# Patient Record
Sex: FEMALE | Race: BLACK OR AFRICAN AMERICAN | Employment: FULL TIME | URBAN - METROPOLITAN AREA
[De-identification: names, ages, dates, MRNs, and addresses within clinical notes are randomized per-mention and may not be internally consistent; named-entity substitution may affect disease eponyms.]

---

## 2020-09-25 ENCOUNTER — HOSPITAL ENCOUNTER (EMERGENCY)
Facility: HOSPITAL | Age: 30
Discharge: HOME | End: 2020-09-25
Attending: EMERGENCY MEDICINE
Payer: COMMERCIAL

## 2020-09-25 VITALS
HEART RATE: 88 BPM | TEMPERATURE: 98 F | OXYGEN SATURATION: 97 % | HEIGHT: 67 IN | BODY MASS INDEX: 27.47 KG/M2 | WEIGHT: 175 LBS | DIASTOLIC BLOOD PRESSURE: 60 MMHG | RESPIRATION RATE: 18 BRPM | SYSTOLIC BLOOD PRESSURE: 116 MMHG

## 2020-09-25 DIAGNOSIS — M25.562 ACUTE PAIN OF BOTH KNEES: Primary | ICD-10-CM

## 2020-09-25 DIAGNOSIS — M25.561 ACUTE PAIN OF BOTH KNEES: Primary | ICD-10-CM

## 2020-09-25 PROCEDURE — 99282 EMERGENCY DEPT VISIT SF MDM: CPT

## 2020-09-25 RX ORDER — NAPROXEN 500 MG/1
500 TABLET ORAL 2 TIMES DAILY PRN
Qty: 20 TABLET | Refills: 0 | Status: SHIPPED | OUTPATIENT
Start: 2020-09-25 | End: 2021-04-07

## 2020-09-25 NOTE — ED INITIAL ASSESSMENT (HPI)
C/o bilateral knee pain and swelling, left greater than right, for past few weeks. Swelling new the past few days. Denies trauma.

## 2020-09-25 NOTE — ED PROVIDER NOTES
Patient Seen in: [facility] AND [facility]      History   Patient presents with:  Knee Pain    Stated Complaint: bilateral knee pain/sore    HPI    The patient is a 59-year-old female who presents with 2 to 3 weeks of pain to both knees.   In normal.      Breath sounds: Normal breath sounds. Musculoskeletal: Normal range of motion. Right knee: She exhibits normal range of motion, no swelling, no effusion, no erythema, no LCL laxity, no bony tenderness and no MCL laxity.  No tenderness fou

## 2020-10-29 ENCOUNTER — OFFICE VISIT (OUTPATIENT)
Dept: OBGYN CLINIC | Facility: CLINIC | Age: 30
End: 2020-10-29
Payer: COMMERCIAL

## 2020-10-29 ENCOUNTER — LAB ENCOUNTER (OUTPATIENT)
Dept: LAB | Facility: REFERENCE LAB | Age: 30
End: 2020-10-29
Attending: OBSTETRICS & GYNECOLOGY
Payer: COMMERCIAL

## 2020-10-29 VITALS
WEIGHT: 182 LBS | BODY MASS INDEX: 28.56 KG/M2 | SYSTOLIC BLOOD PRESSURE: 110 MMHG | DIASTOLIC BLOOD PRESSURE: 64 MMHG | HEIGHT: 67 IN

## 2020-10-29 DIAGNOSIS — Z32.01 PREGNANCY EXAMINATION OR TEST, POSITIVE RESULT: Primary | ICD-10-CM

## 2020-10-29 DIAGNOSIS — Z32.01 PREGNANCY EXAMINATION OR TEST, POSITIVE RESULT: ICD-10-CM

## 2020-10-29 DIAGNOSIS — Z11.3 SCREENING EXAMINATION FOR VENEREAL DISEASE: ICD-10-CM

## 2020-10-29 PROCEDURE — 87106 FUNGI IDENTIFICATION YEAST: CPT | Performed by: OBSTETRICS & GYNECOLOGY

## 2020-10-29 PROCEDURE — 84702 CHORIONIC GONADOTROPIN TEST: CPT

## 2020-10-29 PROCEDURE — 36415 COLL VENOUS BLD VENIPUNCTURE: CPT

## 2020-10-29 PROCEDURE — 84144 ASSAY OF PROGESTERONE: CPT

## 2020-10-29 PROCEDURE — 87808 TRICHOMONAS ASSAY W/OPTIC: CPT | Performed by: OBSTETRICS & GYNECOLOGY

## 2020-10-29 PROCEDURE — 3008F BODY MASS INDEX DOCD: CPT | Performed by: OBSTETRICS & GYNECOLOGY

## 2020-10-29 PROCEDURE — 81025 URINE PREGNANCY TEST: CPT | Performed by: OBSTETRICS & GYNECOLOGY

## 2020-10-29 PROCEDURE — 3074F SYST BP LT 130 MM HG: CPT | Performed by: OBSTETRICS & GYNECOLOGY

## 2020-10-29 PROCEDURE — 87591 N.GONORRHOEAE DNA AMP PROB: CPT | Performed by: OBSTETRICS & GYNECOLOGY

## 2020-10-29 PROCEDURE — 87491 CHLMYD TRACH DNA AMP PROBE: CPT | Performed by: OBSTETRICS & GYNECOLOGY

## 2020-10-29 PROCEDURE — 3078F DIAST BP <80 MM HG: CPT | Performed by: OBSTETRICS & GYNECOLOGY

## 2020-10-29 PROCEDURE — 87205 SMEAR GRAM STAIN: CPT | Performed by: OBSTETRICS & GYNECOLOGY

## 2020-10-29 PROCEDURE — 99203 OFFICE O/P NEW LOW 30 MIN: CPT | Performed by: OBSTETRICS & GYNECOLOGY

## 2020-10-29 RX ORDER — DEXAMETHASONE 4 MG/1
TABLET ORAL 2 TIMES DAILY
COMMUNITY
End: 2021-05-13

## 2020-10-29 RX ORDER — MONTELUKAST SODIUM 10 MG/1
10 TABLET ORAL NIGHTLY
COMMUNITY

## 2020-10-29 RX ORDER — ALBUTEROL SULFATE 90 UG/1
AEROSOL, METERED RESPIRATORY (INHALATION) EVERY 6 HOURS PRN
COMMUNITY

## 2020-10-29 RX ORDER — CALCIUM CARBONATE 300MG(750)
TABLET,CHEWABLE ORAL
COMMUNITY

## 2020-10-29 NOTE — PROGRESS NOTES
NEW GYN H&P     10/29/2020  11:06 AM    Patient presents with:  New Patient: pregnancy confirmation LMP 09/12/2020 EGA 7w2d  Nausea/vomiting: pt c/o morning sickness   .     HPI: Patient is a 27year old [id number] LMP 9/12/20 at EGA 7.2w here to establish car • History of use of contraceptive intrauterine device (IUD) 01/2017    Paragard IUD 01/2017-03/2020   • Human papilloma virus infection 2008   • Thyroid disease     thyroid nodule     Past Surgical History:   Procedure Laterality Date   • COLPOSCOPY, CE bilaterally nontender, no palpable masses, no nipple discharge, no skin changes, no axillary adenopathy  ABDOMEN: Soft, non distended; non tender, no masses  GYNE/:   External Genitalia: normal, no lesions, good perineal support  Urethra: meatus normal

## 2020-10-30 ENCOUNTER — PATIENT MESSAGE (OUTPATIENT)
Dept: OBGYN CLINIC | Facility: CLINIC | Age: 30
End: 2020-10-30

## 2020-10-30 NOTE — TELEPHONE ENCOUNTER
From: Sarina Oh  To: Aletha Hernandez MD  Sent: 10/30/2020 10:52 AM CDT  Subject: Test Results Question    Hi,   I have a question about HCG, BETA SUBUNIT (QUANT PREGNANCY TEST) resulted on 10/29/20, 9:43 PM.  What does these results mean?

## 2020-11-03 ENCOUNTER — LAB ENCOUNTER (OUTPATIENT)
Dept: LAB | Facility: HOSPITAL | Age: 30
End: 2020-11-03
Attending: OBSTETRICS & GYNECOLOGY
Payer: COMMERCIAL

## 2020-11-03 DIAGNOSIS — Z32.01 PREGNANCY EXAMINATION OR TEST, POSITIVE RESULT: ICD-10-CM

## 2020-11-03 PROCEDURE — 84702 CHORIONIC GONADOTROPIN TEST: CPT

## 2020-11-03 PROCEDURE — 36415 COLL VENOUS BLD VENIPUNCTURE: CPT

## 2020-11-03 PROCEDURE — 84144 ASSAY OF PROGESTERONE: CPT

## 2020-11-03 NOTE — PROGRESS NOTES
Released to [facility] and results viewed by pt. Outpatient Medication Detail     Disp Refills Start End   Progesterone Micronized (PROMETRIUM) 200 MG Oral Cap 60 capsule 0 10/30/2020 12/29/2020   Sig - Route: Take 1 capsule (200 mg total) by mouth daily.  -

## 2020-11-11 ENCOUNTER — HOSPITAL ENCOUNTER (OUTPATIENT)
Dept: ULTRASOUND IMAGING | Age: 30
Discharge: HOME | End: 2020-11-11
Attending: OBSTETRICS & GYNECOLOGY
Payer: COMMERCIAL

## 2020-11-11 DIAGNOSIS — Z32.01 PREGNANCY EXAMINATION OR TEST, POSITIVE RESULT: ICD-10-CM

## 2020-11-11 PROCEDURE — 76817 TRANSVAGINAL US OBSTETRIC: CPT | Performed by: OBSTETRICS & GYNECOLOGY

## 2020-11-11 PROCEDURE — 76801 OB US < 14 WKS SINGLE FETUS: CPT | Performed by: OBSTETRICS & GYNECOLOGY

## 2020-11-12 ENCOUNTER — TELEPHONE (OUTPATIENT)
Dept: OBGYN CLINIC | Facility: CLINIC | Age: 30
End: 2020-11-12

## 2020-11-12 NOTE — TELEPHONE ENCOUNTER
Called pt and has hx of migraine started 2 to 3 years ago and on medication. Pt can not find medication and taking Tylenol. Informed can also try caffeine (coffee) and contacting PCP. Pt verbalized understanding.

## 2020-11-12 NOTE — PROGRESS NOTES
Released to [employer] and results viewed by pt. Next Appt:    With  OBSTETRICS/GYNECOLOGY Ida De Leon MD)  11/20/2020 at 10:30 AM

## 2020-11-13 ENCOUNTER — TELEPHONE (OUTPATIENT)
Dept: OBGYN CLINIC | Facility: CLINIC | Age: 30
End: 2020-11-13

## 2020-11-13 NOTE — TELEPHONE ENCOUNTER
This RN placed call to patient to offer appt for RN education visit. Pt scheduled for 10/18 @1pm.  RN to mail packet of information to patient at [email or web address] All questions answered.

## 2020-11-18 ENCOUNTER — NURSE ONLY (OUTPATIENT)
Dept: OBGYN CLINIC | Facility: CLINIC | Age: 30
End: 2020-11-18
Payer: COMMERCIAL

## 2020-11-18 DIAGNOSIS — Z34.81 ENCOUNTER FOR SUPERVISION OF OTHER NORMAL PREGNANCY IN FIRST TRIMESTER: Primary | ICD-10-CM

## 2020-11-20 ENCOUNTER — TELEPHONE (OUTPATIENT)
Dept: OBGYN CLINIC | Facility: CLINIC | Age: 30
End: 2020-11-20

## 2020-11-23 ENCOUNTER — PATIENT MESSAGE (OUTPATIENT)
Dept: OBGYN CLINIC | Facility: CLINIC | Age: 30
End: 2020-11-23

## 2020-11-23 ENCOUNTER — LAB ENCOUNTER (OUTPATIENT)
Dept: LAB | Age: 30
End: 2020-11-23
Attending: OBSTETRICS & GYNECOLOGY
Payer: COMMERCIAL

## 2020-11-23 DIAGNOSIS — Z34.81 ENCOUNTER FOR SUPERVISION OF OTHER NORMAL PREGNANCY IN FIRST TRIMESTER: ICD-10-CM

## 2020-11-23 LAB
HBV SURFACE AG SER QL: NEGATIVE
HIV 1+2 AB+HIV1 P24 AG SERPL QL IA: NONREACTIVE
RUBV IGG SERPL IA-ACNC: NORMAL
TREPONEMA PALLIDUM IGG/IGM AB (SYPGM): NEGATIVE

## 2020-11-23 PROCEDURE — 86850 RBC ANTIBODY SCREEN: CPT

## 2020-11-23 PROCEDURE — 87389 HIV-1 AG W/HIV-1&-2 AB AG IA: CPT

## 2020-11-23 PROCEDURE — 86077 PHYS BLOOD BANK SERV XMATCH: CPT

## 2020-11-23 PROCEDURE — 36415 COLL VENOUS BLD VENIPUNCTURE: CPT

## 2020-11-23 PROCEDURE — 87340 HEPATITIS B SURFACE AG IA: CPT

## 2020-11-23 PROCEDURE — 86905 BLOOD TYPING RBC ANTIGENS: CPT

## 2020-11-23 PROCEDURE — 85025 COMPLETE CBC W/AUTO DIFF WBC: CPT

## 2020-11-23 PROCEDURE — 86880 COOMBS TEST DIRECT: CPT

## 2020-11-23 PROCEDURE — 86762 RUBELLA ANTIBODY: CPT

## 2020-11-23 PROCEDURE — 86900 BLOOD TYPING SEROLOGIC ABO: CPT

## 2020-11-23 PROCEDURE — 86870 RBC ANTIBODY IDENTIFICATION: CPT

## 2020-11-23 PROCEDURE — 86780 TREPONEMA PALLIDUM: CPT

## 2020-11-23 PROCEDURE — 86901 BLOOD TYPING SEROLOGIC RH(D): CPT

## 2020-11-23 PROCEDURE — 87086 URINE CULTURE/COLONY COUNT: CPT

## 2020-11-24 NOTE — TELEPHONE ENCOUNTER
From: Kimberly Schaumann  To: Nadine Santos MD  Sent: 11/23/2020 7:06 PM CST  Subject: Test Results Question    Hi Kiel Ward can you explain these results for me.  I have a question about ANTIBODY SCREEN resulted on 11/23/20, 6:51 PM.

## 2020-11-30 NOTE — PROGRESS NOTES
Released to [employer] and results viewed by pt. Next Appt:    With  OBSTETRICS/GYNECOLOGY Yudy Canchola MD)  12/02/2020 at 3:00 PM

## 2020-12-02 ENCOUNTER — TELEPHONE (OUTPATIENT)
Dept: OBGYN CLINIC | Facility: CLINIC | Age: 30
End: 2020-12-02

## 2020-12-02 ENCOUNTER — INITIAL PRENATAL (OUTPATIENT)
Dept: OBGYN CLINIC | Facility: CLINIC | Age: 30
End: 2020-12-02
Payer: COMMERCIAL

## 2020-12-02 VITALS
DIASTOLIC BLOOD PRESSURE: 80 MMHG | HEIGHT: 67 IN | BODY MASS INDEX: 29.15 KG/M2 | SYSTOLIC BLOOD PRESSURE: 120 MMHG | WEIGHT: 185.69 LBS

## 2020-12-02 DIAGNOSIS — R76.0 ELEVATED ANTIBODY LEVELS: ICD-10-CM

## 2020-12-02 DIAGNOSIS — Z87.59 HISTORY OF POSTPARTUM HEMORRHAGE: ICD-10-CM

## 2020-12-02 DIAGNOSIS — Z34.81 MULTIGRAVIDA IN FIRST TRIMESTER: Primary | ICD-10-CM

## 2020-12-02 DIAGNOSIS — O99.011 ANEMIA DURING PREGNANCY IN FIRST TRIMESTER: ICD-10-CM

## 2020-12-02 PROCEDURE — 3008F BODY MASS INDEX DOCD: CPT | Performed by: OBSTETRICS & GYNECOLOGY

## 2020-12-02 PROCEDURE — 3079F DIAST BP 80-89 MM HG: CPT | Performed by: OBSTETRICS & GYNECOLOGY

## 2020-12-02 PROCEDURE — 3074F SYST BP LT 130 MM HG: CPT | Performed by: OBSTETRICS & GYNECOLOGY

## 2020-12-02 RX ORDER — METHYLDOPA 500 MG
160 TABLET ORAL DAILY
Qty: 90 TABLET | Refills: 3 | Status: SHIPPED | OUTPATIENT
Start: 2020-12-02 | End: 2021-01-01

## 2020-12-02 RX ORDER — ASCORBIC ACID, CHOLECALCIFEROL, .ALPHA.-TOCOPHEROL, DL-, PYRIDOXINE HYDROCHLORIDE, FOLIC ACID, CYANOCOBALAMIN, CALCIUM CARBONATE, FERROUS FUMARATE, MAGNESIUM OXIDE AND DOCONEXENT 90; 220; 10; 26; 1; 13; 145; 28; 50; 300 MG/1; [IU]/1; [IU]/1; MG/1; MG/1; UG/1; MG/1; MG/1; MG/1; MG/1
1 CAPSULE, GELATIN COATED ORAL DAILY
Qty: 90 CAPSULE | Refills: 3 | Status: SHIPPED | OUTPATIENT
Start: 2020-12-02 | End: 2021-03-02

## 2020-12-02 NOTE — PROGRESS NOTES
Here for MD CASANOVA visit. Has completed RN education visit and had FFDNA drawn 11/23 - results still pending. History reviewed concerning chronic anemia of unsure etiology as well as 2 prior PPH with both NSVDs at [facility].  Discussed need for workup with

## 2020-12-02 NOTE — TELEPHONE ENCOUNTER
Precious from [facility] calling to edith with Dr. Valentina Baez.    Call/telephone encounter routed to LETY

## 2020-12-07 ENCOUNTER — TELEPHONE (OUTPATIENT)
Dept: OBGYN CLINIC | Facility: CLINIC | Age: 30
End: 2020-12-07

## 2020-12-07 NOTE — TELEPHONE ENCOUNTER
Patient requesting an call back in regard to to medication that were to be sent to pharmacy states wasn't there also requesting genetic testing results

## 2020-12-07 NOTE — TELEPHONE ENCOUNTER
Called [facility] for genetic test results and may be back by mid to end of week. Unable to leave message mailbox is full. I tired to call you and leave a message. Your mailbox is full. I called [facility] in regards to your genetic screen and it should be available mid to end of week. Also if you can call in regards to your medications, I am happy to assist you. Any concerns or additional questions, please call us at .

## 2020-12-14 ENCOUNTER — TELEPHONE (OUTPATIENT)
Dept: OBGYN CLINIC | Facility: CLINIC | Age: 30
End: 2020-12-14

## 2020-12-14 NOTE — TELEPHONE ENCOUNTER
RN returning pt call. Pt asking for results of genetic screening. RN informed pt that the usual turnaround time is 7-10 business days and we had not received them as of yet.  (pt was drawn 12/8/20) PT verbalized understanding, no other concerns at this time

## 2020-12-15 ENCOUNTER — TELEPHONE (OUTPATIENT)
Dept: OBGYN CLINIC | Facility: CLINIC | Age: 30
End: 2020-12-15

## 2020-12-16 NOTE — TELEPHONE ENCOUNTER
Fax received from [facility] that [facility] spoke with pt r/t negative cfDNA results. Fetal sex was not revealed to pt.
Per Dr. Duane Jonas ok to call pt with Negative [facility] cfDNA results. Spoke to pt, negative cfDNA results given and pt states gender is surprise. Pt voiced understanding and excited about results.
normal...

## 2021-01-06 ENCOUNTER — PATIENT MESSAGE (OUTPATIENT)
Dept: OBGYN CLINIC | Facility: CLINIC | Age: 31
End: 2021-01-06

## 2021-01-06 ENCOUNTER — ROUTINE PRENATAL (OUTPATIENT)
Dept: OBGYN CLINIC | Facility: CLINIC | Age: 31
End: 2021-01-06
Payer: COMMERCIAL

## 2021-01-06 VITALS
SYSTOLIC BLOOD PRESSURE: 120 MMHG | WEIGHT: 184 LBS | DIASTOLIC BLOOD PRESSURE: 80 MMHG | HEIGHT: 67 IN | BODY MASS INDEX: 28.88 KG/M2

## 2021-01-06 DIAGNOSIS — O99.011 ANEMIA DURING PREGNANCY IN FIRST TRIMESTER: Primary | ICD-10-CM

## 2021-01-06 DIAGNOSIS — Z36.9 UNSPECIFIED ANTENATAL SCREENING: ICD-10-CM

## 2021-01-06 DIAGNOSIS — Z34.81 MULTIGRAVIDA IN FIRST TRIMESTER: ICD-10-CM

## 2021-01-06 LAB — MULTISTIX LOT#: 1044 NUMERIC

## 2021-01-06 PROCEDURE — 3079F DIAST BP 80-89 MM HG: CPT | Performed by: OBSTETRICS & GYNECOLOGY

## 2021-01-06 PROCEDURE — 3008F BODY MASS INDEX DOCD: CPT | Performed by: OBSTETRICS & GYNECOLOGY

## 2021-01-06 PROCEDURE — 3074F SYST BP LT 130 MM HG: CPT | Performed by: OBSTETRICS & GYNECOLOGY

## 2021-01-06 PROCEDURE — 81002 URINALYSIS NONAUTO W/O SCOPE: CPT | Performed by: OBSTETRICS & GYNECOLOGY

## 2021-01-06 NOTE — PROGRESS NOTES
Here with no pregnancy complaints although reports episodes on and off of feeling heart race and shortness of breath over the past month. Not experiencing right now. Has not scheduled Hematology yet for chronic anemia Hgb of 9.3.  Encouraged patient to sche

## 2021-01-06 NOTE — TELEPHONE ENCOUNTER
Spoke to pt, explained attached link could not be opened. My health work email given to pt to sent over [facility] paperwork. explained to pt I will print out paper work and give to staff to complete paperwork. Pt voiced understanding.

## 2021-01-06 NOTE — TELEPHONE ENCOUNTER
From: Fan Moya  To: Gregory Brown MD  Sent: 1/6/2021 3:51 PM CST  Subject: Visit Follow-up Question    [email or web address]    Hi  here are my ADA forms.  They are for returning to work t

## 2021-01-07 ENCOUNTER — TELEPHONE (OUTPATIENT)
Dept: HEMATOLOGY/ONCOLOGY | Facility: HOSPITAL | Age: 31
End: 2021-01-07

## 2021-01-07 NOTE — TELEPHONE ENCOUNTER
Chantal Davila was referred to  by Payal Jose for  Anemia during pregnancy in first trimester  Z87.59 (ICD-10-CM) - V13.29 (ICD-9-CM) - History of postpartum hemorrhage. Is there an availability you would like me to offer this patient?
97.9

## 2021-01-07 NOTE — TELEPHONE ENCOUNTER
From: Mike Wesley  To: Higinio Canavan, MD  Sent: 1/6/2021 6:14 PM CST  Subject: Non-Urgent Medical Question    ADA forms.

## 2021-01-07 NOTE — TELEPHONE ENCOUNTER
From: Wily Cartwright  To: Shivani Walton MD  Sent: 1/6/2021 6:14 PM CST  Subject: Non-Urgent Medical Question    ADA forms.

## 2021-01-07 NOTE — TELEPHONE ENCOUNTER
Spoke with pt on 01/06/2021, aware I was able to print out all attached forms. Pt states she is a  and needs forms filled out before RTW 01/25/21. Per pt please fax forms back to number listed on FMLA forms.    Pt aware turn around time for

## 2021-01-07 NOTE — TELEPHONE ENCOUNTER
From: Jg Leon  To: Stu Castellanos MD  Sent: 1/6/2021 6:13 PM CST  Subject: Non-Urgent Medical Question    ADA

## 2021-01-11 ENCOUNTER — TELEPHONE (OUTPATIENT)
Dept: OBGYN CLINIC | Facility: CLINIC | Age: 31
End: 2021-01-11

## 2021-01-13 NOTE — TELEPHONE ENCOUNTER
Spoke to patient on 1/11/21 regarding her paperwork. 1/13/21, spoke to patient and informed her that there paperwork has been faxed. Patient verbalized understanding.

## 2021-01-19 ENCOUNTER — TELEPHONE (OUTPATIENT)
Dept: OBGYN CLINIC | Facility: CLINIC | Age: 31
End: 2021-01-19

## 2021-01-22 ENCOUNTER — APPOINTMENT (OUTPATIENT)
Dept: HEMATOLOGY/ONCOLOGY | Facility: HOSPITAL | Age: 31
End: 2021-01-22
Attending: INTERNAL MEDICINE
Payer: COMMERCIAL

## 2021-01-22 ENCOUNTER — TELEPHONE (OUTPATIENT)
Dept: HEMATOLOGY/ONCOLOGY | Facility: HOSPITAL | Age: 31
End: 2021-01-22

## 2021-01-22 NOTE — TELEPHONE ENCOUNTER
Makayla Pham came in for her consult today, but had to reschedule due to  issues. She was coming in for anemia during pregnancy, and rescheduled to the next available 2/19. Was there a sooner date you would like to see this patient?

## 2021-01-25 ENCOUNTER — HOSPITAL ENCOUNTER (OUTPATIENT)
Age: 31
Discharge: HOME | End: 2021-01-25
Attending: EMERGENCY MEDICINE
Payer: COMMERCIAL

## 2021-01-25 ENCOUNTER — TELEPHONE (OUTPATIENT)
Dept: OBGYN CLINIC | Facility: CLINIC | Age: 31
End: 2021-01-25

## 2021-01-25 VITALS
RESPIRATION RATE: 18 BRPM | HEART RATE: 99 BPM | DIASTOLIC BLOOD PRESSURE: 69 MMHG | SYSTOLIC BLOOD PRESSURE: 138 MMHG | OXYGEN SATURATION: 100 % | TEMPERATURE: 98 F

## 2021-01-25 DIAGNOSIS — J06.9 VIRAL URI WITH COUGH: Primary | ICD-10-CM

## 2021-01-25 DIAGNOSIS — Z87.09 HISTORY OF ASTHMA: ICD-10-CM

## 2021-01-25 DIAGNOSIS — Z3A.20 20 WEEKS GESTATION OF PREGNANCY: ICD-10-CM

## 2021-01-25 LAB — SARS-COV-2 RNA RESP QL NAA+PROBE: NOT DETECTED

## 2021-01-25 PROCEDURE — 99212 OFFICE O/P EST SF 10 MIN: CPT

## 2021-01-25 PROCEDURE — 99203 OFFICE O/P NEW LOW 30 MIN: CPT

## 2021-01-25 RX ORDER — PREDNISONE 20 MG/1
40 TABLET ORAL DAILY
Qty: 10 TABLET | Refills: 0 | Status: SHIPPED | OUTPATIENT
Start: 2021-01-25 | End: 2021-01-30

## 2021-01-25 NOTE — TELEPHONE ENCOUNTER
Pt would like ADA forms competed by Anthony Thrasher to be emailed to her at [email or web address]    Pt also c/o coughing and feeling sick, runny nose and chest pain. Pt is 19w2d. Pt planning on going to [facility] in [place].

## 2021-01-26 NOTE — ED INITIAL ASSESSMENT (HPI)
PATIENT ARRIVED AMBULATORY TO ROOM C/O A COUGH X2 WEEKS. PATIENT STATES \"IT FEELS LIKE MY ASTHMA. I GOT A NEW RX FOR AN INHALER THAT I HAVE NOT PICKED UP YET\" +NASAL CONGESTION. NO FEVERS. NO N/V/D. PATIENT STATES SHE IS NOT CONCERNED FOR COVID.  PATIENT

## 2021-01-26 NOTE — ED PROVIDER NOTES
Patient Seen in: [facility]      History   Patient presents with:  Asthma    Stated Complaint: asthma acting up, hard to breat, and chest hurts    HPI/Subjective:   HPI    The patient is a 20-year-old female G3 para 2 at approximately 20 week Device None (Room air)       Current:/69   Pulse 99   Temp 97.7 °F (36.5 °C)   Resp 18   LMP 09/12/2020 (Exact Date)   SpO2 100%         Physical Exam    Constitutional: Well-developed well-nourished in no acute distress  Head: Normocephalic, no swel pregnancy  History of asthma    Disposition:  Discharge  1/25/2021  7:13 pm    Follow-up:  Manav Benedict MD  [place] [address]  [phone]    In 2 days  Go to the ER immediately for any recurrent chest pain or

## 2021-01-29 DIAGNOSIS — Z36.9 UNSPECIFIED ANTENATAL SCREENING: Primary | ICD-10-CM

## 2021-02-02 ENCOUNTER — APPOINTMENT (OUTPATIENT)
Dept: HEMATOLOGY/ONCOLOGY | Facility: HOSPITAL | Age: 31
End: 2021-02-02
Attending: INTERNAL MEDICINE
Payer: COMMERCIAL

## 2021-02-02 ENCOUNTER — APPOINTMENT (OUTPATIENT)
Dept: GENERAL RADIOLOGY | Facility: HOSPITAL | Age: 31
End: 2021-02-02
Attending: EMERGENCY MEDICINE
Payer: COMMERCIAL

## 2021-02-02 ENCOUNTER — HOSPITAL ENCOUNTER (OUTPATIENT)
Facility: HOSPITAL | Age: 31
Setting detail: OBSERVATION
Discharge: HOME | End: 2021-02-02
Attending: OBSTETRICS & GYNECOLOGY | Admitting: OBSTETRICS & GYNECOLOGY
Payer: COMMERCIAL

## 2021-02-02 ENCOUNTER — APPOINTMENT (OUTPATIENT)
Dept: CT IMAGING | Facility: HOSPITAL | Age: 31
End: 2021-02-02
Attending: EMERGENCY MEDICINE
Payer: COMMERCIAL

## 2021-02-02 ENCOUNTER — HOSPITAL ENCOUNTER (EMERGENCY)
Facility: HOSPITAL | Age: 31
Discharge: HOME | End: 2021-02-02
Attending: EMERGENCY MEDICINE
Payer: COMMERCIAL

## 2021-02-02 VITALS
HEIGHT: 67 IN | DIASTOLIC BLOOD PRESSURE: 65 MMHG | RESPIRATION RATE: 20 BRPM | BODY MASS INDEX: 29.35 KG/M2 | HEART RATE: 101 BPM | SYSTOLIC BLOOD PRESSURE: 116 MMHG | OXYGEN SATURATION: 96 % | WEIGHT: 187 LBS

## 2021-02-02 VITALS — SYSTOLIC BLOOD PRESSURE: 115 MMHG | HEART RATE: 92 BPM | DIASTOLIC BLOOD PRESSURE: 66 MMHG

## 2021-02-02 DIAGNOSIS — S20.229A CONTUSION OF BACK, UNSPECIFIED LATERALITY, INITIAL ENCOUNTER: ICD-10-CM

## 2021-02-02 DIAGNOSIS — Z3A.21 21 WEEKS GESTATION OF PREGNANCY: ICD-10-CM

## 2021-02-02 DIAGNOSIS — S70.02XA CONTUSION OF LEFT HIP, INITIAL ENCOUNTER: ICD-10-CM

## 2021-02-02 DIAGNOSIS — S09.90XA INJURY OF HEAD, INITIAL ENCOUNTER: Primary | ICD-10-CM

## 2021-02-02 PROBLEM — Z34.90 PREGNANCY: Status: ACTIVE | Noted: 2021-02-02

## 2021-02-02 PROBLEM — Z34.90 PREGNANCY (HCC): Status: ACTIVE | Noted: 2021-02-02

## 2021-02-02 LAB
BILIRUB UR QL: NEGATIVE
COLOR UR: YELLOW
GLUCOSE UR-MCNC: NEGATIVE MG/DL
HGB UR QL STRIP.AUTO: NEGATIVE
KETONES UR-MCNC: 20 MG/DL
NITRITE UR QL STRIP.AUTO: NEGATIVE
PH UR: 6 [PH] (ref 5–8)
PROT UR-MCNC: 30 MG/DL
RBC #/AREA URNS AUTO: 3 /HPF
SP GR UR STRIP: 1.03 (ref 1–1.03)
UROBILINOGEN UR STRIP-ACNC: <2
WBC #/AREA URNS AUTO: 4 /HPF

## 2021-02-02 PROCEDURE — 70450 CT HEAD/BRAIN W/O DYE: CPT | Performed by: EMERGENCY MEDICINE

## 2021-02-02 PROCEDURE — 99284 EMERGENCY DEPT VISIT MOD MDM: CPT

## 2021-02-02 PROCEDURE — 81001 URINALYSIS AUTO W/SCOPE: CPT | Performed by: EMERGENCY MEDICINE

## 2021-02-02 PROCEDURE — 87086 URINE CULTURE/COLONY COUNT: CPT | Performed by: EMERGENCY MEDICINE

## 2021-02-02 PROCEDURE — 71045 X-RAY EXAM CHEST 1 VIEW: CPT | Performed by: EMERGENCY MEDICINE

## 2021-02-02 PROCEDURE — 99212 OFFICE O/P EST SF 10 MIN: CPT

## 2021-02-02 RX ORDER — NITROFURANTOIN 25; 75 MG/1; MG/1
100 CAPSULE ORAL 2 TIMES DAILY
Qty: 14 CAPSULE | Refills: 0 | Status: SHIPPED | OUTPATIENT
Start: 2021-02-02 | End: 2021-02-09

## 2021-02-03 ENCOUNTER — ULTRASOUND ENCOUNTER (OUTPATIENT)
Dept: OBGYN CLINIC | Facility: CLINIC | Age: 31
End: 2021-02-03
Payer: COMMERCIAL

## 2021-02-03 ENCOUNTER — ROUTINE PRENATAL (OUTPATIENT)
Dept: OBGYN CLINIC | Facility: CLINIC | Age: 31
End: 2021-02-03
Payer: COMMERCIAL

## 2021-02-03 VITALS
BODY MASS INDEX: 29.38 KG/M2 | WEIGHT: 187.19 LBS | DIASTOLIC BLOOD PRESSURE: 80 MMHG | SYSTOLIC BLOOD PRESSURE: 122 MMHG | HEIGHT: 67 IN

## 2021-02-03 DIAGNOSIS — O44.00 PLACENTA PREVIA WITHOUT HEMORRHAGE, ANTEPARTUM: ICD-10-CM

## 2021-02-03 DIAGNOSIS — Z34.82 MULTIGRAVIDA IN SECOND TRIMESTER: ICD-10-CM

## 2021-02-03 DIAGNOSIS — Z36.9 UNSPECIFIED ANTENATAL SCREENING: ICD-10-CM

## 2021-02-03 DIAGNOSIS — Z36.9 UNSPECIFIED ANTENATAL SCREENING: Primary | ICD-10-CM

## 2021-02-03 LAB — MULTISTIX LOT#: 5077 NUMERIC

## 2021-02-03 PROCEDURE — 3074F SYST BP LT 130 MM HG: CPT | Performed by: OBSTETRICS & GYNECOLOGY

## 2021-02-03 PROCEDURE — 76805 OB US >/= 14 WKS SNGL FETUS: CPT | Performed by: OBSTETRICS & GYNECOLOGY

## 2021-02-03 PROCEDURE — 76817 TRANSVAGINAL US OBSTETRIC: CPT | Performed by: OBSTETRICS & GYNECOLOGY

## 2021-02-03 PROCEDURE — 3079F DIAST BP 80-89 MM HG: CPT | Performed by: OBSTETRICS & GYNECOLOGY

## 2021-02-03 PROCEDURE — 81002 URINALYSIS NONAUTO W/O SCOPE: CPT | Performed by: OBSTETRICS & GYNECOLOGY

## 2021-02-03 PROCEDURE — 3008F BODY MASS INDEX DOCD: CPT | Performed by: OBSTETRICS & GYNECOLOGY

## 2021-02-03 RX ORDER — ACETAMINOPHEN 500 MG
TABLET ORAL
COMMUNITY
Start: 2020-01-20

## 2021-02-03 NOTE — ED INITIAL ASSESSMENT (HPI)
Patient presents to ER with complaints of back pain, neck pain, and headache. Denies C-spine tenderness. Patient had mechanical fall in which she slipped and fell on ice, landing no her back and hitting her head. Patient is 21 weeks pregnant.   Unsure

## 2021-02-03 NOTE — TRIAGE
[facility]      Triage Note    Dorene Jeffries Patient Status:  Outpatient    1990 MRN [id number]   Location [address]  Attending Kisha Escobedo MD   [facility] Day # 0 PCP Mady Nolan Para: fell at home outside and hit back of head and back, then stood and fell again down some stairs and fell on left side and bottom.  reports mild cramping now, denies leaking of fluid and bleeding, reports FM since fall        Mary Alford RN  2/2/2021 10:50 PM

## 2021-02-03 NOTE — PROGRESS NOTES
Seen in ER last night after falling on ice - no involvement of abdomen and normal head CT. Feeling FM. USN today at 20.4w shows normal anatomic survey and cervical length with persistent complete posterior previa.  Has had no bleeding and complying with pel

## 2021-02-03 NOTE — PROGRESS NOTES
Discharged to home per ambulatory in stable condition with written and verbal instructions. Patient verbalizes understanding of information given. She will follow up with MD tomorrow 2/3/21 in office.

## 2021-02-03 NOTE — ED PROVIDER NOTES
Patient Seen in: [facility] AND [facility] Emergency Department      History   Patient presents with:  Fall  Pain    Stated Complaint: Pain to entire backside of body s/p mechanical fall on ice +21 wks pregnant    HPI/Subjective:   HPI    Patient presents to the +21 wks pregnant  Other systems are as noted in HPI. Constitutional and vital signs reviewed. All other systems reviewed and negative except as noted above.     Physical Exam     ED Triage Vitals [02/02/21 1900]   /75   Pulse 106   Resp 20   Tem There is no midline tenderness to the neck or the back on palpation. There is some bilateral posterior rib pain left greater than right. There is no crepitus. Skin:     General: Skin is warm and dry. Findings: No rash.    Neurological:      Mental best information considering the mechanism of trauma and the  patient's pelvis could be shielded. Discussed this with the patient. Agrees to studies.                        Disposition and Plan     Clinical Impression:  Injury of head, initial encounter

## 2021-02-05 ENCOUNTER — TELEPHONE (OUTPATIENT)
Dept: OBGYN CLINIC | Facility: CLINIC | Age: 31
End: 2021-02-05

## 2021-02-05 NOTE — TELEPHONE ENCOUNTER
Pt called RN back. Pt informed that EB suggesting PCP. Pt stated she would just go to IC. Pt has no other concerns at this time.

## 2021-02-05 NOTE — TELEPHONE ENCOUNTER
Patient called to say she thinks she may have shingles. She has had it before and has it on face. Has normal symptoms.

## 2021-02-05 NOTE — TELEPHONE ENCOUNTER
RN contacted pt. Pt reports having shingles outbreak on nose last year. Pt states \"thingling\" sensation started yesterday with small bump on nose, reports waking up this morning with several bumps. Pt confirms being treated with Valtrex last year.  Pt is

## 2021-02-05 NOTE — TELEPHONE ENCOUNTER
RN spoke to EB. EB is okay with pt taking valtrex for shingles outbreak but is unfamiliar with treatment for shingles. EB advised pt follow up with PCP.      RN contacted pt to inform, mailbox full

## 2021-02-16 ENCOUNTER — TELEPHONE (OUTPATIENT)
Dept: OBGYN CLINIC | Facility: CLINIC | Age: 31
End: 2021-02-16

## 2021-02-16 RX ORDER — METOCLOPRAMIDE 5 MG/1
5 TABLET ORAL EVERY 6 HOURS PRN
Qty: 30 TABLET | Refills: 0 | Status: SHIPPED | OUTPATIENT
Start: 2021-02-16 | End: 2021-03-24

## 2021-02-16 NOTE — TELEPHONE ENCOUNTER
Pt has diarrhea, has been to the bathroom 4 x in the last 1.5 hour. Stomach hurts, feels like she has to throw up.

## 2021-02-16 NOTE — TELEPHONE ENCOUNTER
Called pt 22.3 week has diarrhea and nausea, ate cheerios and ice coffee in a mug, and water. Pt has gone to bathroom often (4x in 1.5 hours) and has stomach pain. Denies fever and vomiting. Advised to go to IC but declined at this time.   Per pt jane

## 2021-02-16 NOTE — TELEPHONE ENCOUNTER
Adrianne Osullivan MD  You 35 minutes ago (2:41 PM)     Please have her start taking an antiemetic. If she has Reglan or Zofran at home then use that if not then please prescribe Reglan 5mg q6h PRN dispense 30 with no refills.  If she doesn’t want a prescribed m

## 2021-02-19 ENCOUNTER — TELEPHONE (OUTPATIENT)
Dept: OBGYN CLINIC | Facility: CLINIC | Age: 31
End: 2021-02-19

## 2021-02-19 ENCOUNTER — APPOINTMENT (OUTPATIENT)
Dept: HEMATOLOGY/ONCOLOGY | Facility: HOSPITAL | Age: 31
End: 2021-02-19
Attending: INTERNAL MEDICINE
Payer: COMMERCIAL

## 2021-02-19 RX ORDER — FLUCONAZOLE 150 MG/1
TABLET ORAL
Qty: 2 TABLET | Refills: 0 | Status: SHIPPED | OUTPATIENT
Start: 2021-02-19 | End: 2021-03-24

## 2021-02-19 NOTE — TELEPHONE ENCOUNTER
Called pt and c/o yeast infection, itchiness with discharge thick. Diflucan ordered, instructions provided, and faxed to pharmacy on file. Pt does have f/u with EB on 03/03/21. Pt verbalized understanding and agrees with POC.

## 2021-02-19 NOTE — TELEPHONE ENCOUNTER
Pt. Roya Ribeiro she developed a yeast infection from the UTI medication.  She has discharge, and is  itchy

## 2021-02-26 ENCOUNTER — OFFICE VISIT (OUTPATIENT)
Dept: HEMATOLOGY/ONCOLOGY | Facility: HOSPITAL | Age: 31
End: 2021-02-26
Attending: INTERNAL MEDICINE
Payer: COMMERCIAL

## 2021-02-26 VITALS
RESPIRATION RATE: 16 BRPM | DIASTOLIC BLOOD PRESSURE: 64 MMHG | HEIGHT: 67 IN | TEMPERATURE: 97 F | WEIGHT: 191 LBS | SYSTOLIC BLOOD PRESSURE: 131 MMHG | BODY MASS INDEX: 29.98 KG/M2 | HEART RATE: 116 BPM | OXYGEN SATURATION: 100 %

## 2021-02-26 DIAGNOSIS — Z87.59 HISTORY OF POSTPARTUM HEMORRHAGE: ICD-10-CM

## 2021-02-26 DIAGNOSIS — O99.012 ANEMIA DURING PREGNANCY IN SECOND TRIMESTER: Primary | ICD-10-CM

## 2021-02-26 DIAGNOSIS — O99.012 ANEMIA DURING PREGNANCY IN SECOND TRIMESTER: ICD-10-CM

## 2021-02-26 LAB
ABO + RH BLD: NORMAL
ANTIBODY SCREEN: NEGATIVE
APTT PPP: 32 SECONDS (ref 23.2–35.3)
BASOPHILS # BLD AUTO: 0.02 X10(3) UL (ref 0–0.2)
BASOPHILS NFR BLD AUTO: 0.3 %
DEPRECATED HBV CORE AB SER IA-ACNC: 3.2 NG/ML
DEPRECATED RDW RBC AUTO: 39.8 FL (ref 35.1–46.3)
EOSINOPHIL # BLD AUTO: 0.13 X10(3) UL (ref 0–0.7)
EOSINOPHIL NFR BLD AUTO: 1.7 %
ERYTHROCYTE [DISTWIDTH] IN BLOOD BY AUTOMATED COUNT: 15.8 % (ref 11–15)
HCT VFR BLD AUTO: 27.6 %
HGB BLD-MCNC: 8.4 G/DL
IMM GRANULOCYTES # BLD AUTO: 0.05 X10(3) UL (ref 0–1)
IMM GRANULOCYTES NFR BLD: 0.6 %
INR BLD: 1.02 (ref 0.9–1.2)
IRON SATURATION: 5 %
IRON SERPL-MCNC: 23 UG/DL
LYMPHOCYTES # BLD AUTO: 1.7 X10(3) UL (ref 1–4)
LYMPHOCYTES NFR BLD AUTO: 22 %
MCH RBC QN AUTO: 21.6 PG (ref 26–34)
MCHC RBC AUTO-ENTMCNC: 30.4 G/DL (ref 31–37)
MCV RBC AUTO: 71.1 FL
MONOCYTES # BLD AUTO: 0.42 X10(3) UL (ref 0.1–1)
MONOCYTES NFR BLD AUTO: 5.4 %
NEUTROPHILS # BLD AUTO: 5.42 X10 (3) UL (ref 1.5–7.7)
NEUTROPHILS # BLD AUTO: 5.42 X10(3) UL (ref 1.5–7.7)
NEUTROPHILS NFR BLD AUTO: 70 %
PLATELET # BLD AUTO: 237 10(3)UL (ref 150–450)
PROTHROMBIN TIME: 13.2 SECONDS (ref 11.8–14.5)
RBC # BLD AUTO: 3.88 X10(6)UL
RH BLOOD TYPE: POSITIVE
TOTAL IRON BINDING CAPACITY: 487 UG/DL (ref 240–450)
TRANSFERRIN SERPL-MCNC: 327 MG/DL (ref 200–360)
WBC # BLD AUTO: 7.7 X10(3) UL (ref 4–11)

## 2021-02-26 PROCEDURE — 85025 COMPLETE CBC W/AUTO DIFF WBC: CPT

## 2021-02-26 PROCEDURE — 36415 COLL VENOUS BLD VENIPUNCTURE: CPT

## 2021-02-26 PROCEDURE — 86901 BLOOD TYPING SEROLOGIC RH(D): CPT

## 2021-02-26 PROCEDURE — 85610 PROTHROMBIN TIME: CPT

## 2021-02-26 PROCEDURE — 85730 THROMBOPLASTIN TIME PARTIAL: CPT

## 2021-02-26 PROCEDURE — 84466 ASSAY OF TRANSFERRIN: CPT

## 2021-02-26 PROCEDURE — 86850 RBC ANTIBODY SCREEN: CPT

## 2021-02-26 PROCEDURE — 99244 OFF/OP CNSLTJ NEW/EST MOD 40: CPT | Performed by: INTERNAL MEDICINE

## 2021-02-26 PROCEDURE — 86900 BLOOD TYPING SEROLOGIC ABO: CPT

## 2021-02-26 PROCEDURE — 82728 ASSAY OF FERRITIN: CPT

## 2021-02-26 PROCEDURE — 83540 ASSAY OF IRON: CPT

## 2021-02-26 NOTE — CONSULTS
TANNA Jones is a 27year old female who is here today at the request of Dr. Sharri Lofton for evaluation of Anemia during pregnancy in second trimester  (primary encounter diagnosis)  History of postpartum hemorrhage.       The anemia has been present Negative for abdominal pain and blood in stool. Genitourinary: Positive for menstrual problem (as above). Negative for hematuria. Recent UTI   Neurological: Positive for dizziness (occasional if moves too fast.) and headaches.    Hematological: Do DEVICE  01/2017    Paragard IUD    • REMOVE INTRAUTERINE DEVICE  03/2020     Social History    Tobacco Use      Smoking status: Never Smoker      Smokeless tobacco: Never Used    Alcohol use: Never      Frequency: Never    Drug use: Never      Family Histo for the  at the time of delivery. If this is still the case, would recommend that the  have a hematologist involved at the time of delivery for close monitoring of  hemolytic anemia.     No orders of the defined types were placed in t TREATCELL  Final result 11/23/2020             LITTLE E ANTIGEN  Final result 11/23/2020   positive          LITTLE C ANTIGEN  Final result 11/23/2020   positive          E ANTIGEN  Final result 11/23/2020             C ANTIGEN  Final result 11/23/2020

## 2021-03-01 PROBLEM — D50.9 IRON DEFICIENCY ANEMIA: Status: ACTIVE | Noted: 2021-03-01

## 2021-03-03 ENCOUNTER — TELEPHONE (OUTPATIENT)
Dept: HEMATOLOGY/ONCOLOGY | Facility: HOSPITAL | Age: 31
End: 2021-03-03

## 2021-03-03 ENCOUNTER — OFFICE VISIT (OUTPATIENT)
Dept: HEMATOLOGY/ONCOLOGY | Facility: HOSPITAL | Age: 31
End: 2021-03-03
Attending: INTERNAL MEDICINE
Payer: COMMERCIAL

## 2021-03-03 ENCOUNTER — ROUTINE PRENATAL (OUTPATIENT)
Dept: OBGYN CLINIC | Facility: CLINIC | Age: 31
End: 2021-03-03
Payer: COMMERCIAL

## 2021-03-03 VITALS
SYSTOLIC BLOOD PRESSURE: 113 MMHG | HEART RATE: 95 BPM | DIASTOLIC BLOOD PRESSURE: 69 MMHG | TEMPERATURE: 98 F | RESPIRATION RATE: 16 BRPM | OXYGEN SATURATION: 99 %

## 2021-03-03 VITALS
BODY MASS INDEX: 30.09 KG/M2 | HEIGHT: 67 IN | DIASTOLIC BLOOD PRESSURE: 70 MMHG | WEIGHT: 191.69 LBS | SYSTOLIC BLOOD PRESSURE: 106 MMHG

## 2021-03-03 DIAGNOSIS — O99.012 ANEMIA DURING PREGNANCY IN SECOND TRIMESTER: ICD-10-CM

## 2021-03-03 DIAGNOSIS — Z36.9 UNSPECIFIED ANTENATAL SCREENING: Primary | ICD-10-CM

## 2021-03-03 DIAGNOSIS — Z34.82 MULTIGRAVIDA IN SECOND TRIMESTER: ICD-10-CM

## 2021-03-03 DIAGNOSIS — D50.9 IRON DEFICIENCY ANEMIA, UNSPECIFIED IRON DEFICIENCY ANEMIA TYPE: Primary | ICD-10-CM

## 2021-03-03 DIAGNOSIS — O44.00 PLACENTA PREVIA WITHOUT HEMORRHAGE, ANTEPARTUM: ICD-10-CM

## 2021-03-03 LAB — MULTISTIX LOT#: 5077 NUMERIC

## 2021-03-03 PROCEDURE — 3078F DIAST BP <80 MM HG: CPT | Performed by: OBSTETRICS & GYNECOLOGY

## 2021-03-03 PROCEDURE — 3074F SYST BP LT 130 MM HG: CPT | Performed by: OBSTETRICS & GYNECOLOGY

## 2021-03-03 PROCEDURE — 3008F BODY MASS INDEX DOCD: CPT | Performed by: OBSTETRICS & GYNECOLOGY

## 2021-03-03 PROCEDURE — 81002 URINALYSIS NONAUTO W/O SCOPE: CPT | Performed by: OBSTETRICS & GYNECOLOGY

## 2021-03-03 PROCEDURE — 96374 THER/PROPH/DIAG INJ IV PUSH: CPT

## 2021-03-03 RX ORDER — VALACYCLOVIR HYDROCHLORIDE 1 G/1
TABLET, FILM COATED ORAL 3 TIMES DAILY
COMMUNITY
Start: 2021-02-05 | End: 2021-04-07

## 2021-03-03 NOTE — PROGRESS NOTES
Active FM. Doing well - undergoing iron infusion for anemia with CBC to be done today. Placenta previa stable with no vaginal bleeding. Last USN was complete and posterior - repeat USN scheduled for 28w when here for GTT + tdap + CBC.

## 2021-03-03 NOTE — TELEPHONE ENCOUNTER
Returned call to [place], she had Venofer today and when she got home she found swelling to and around IV site size of a dinner roll, no bleeding or drainage, some discomfort/pressure, no rash or other side effects at this time,  Instructed to apply cold fo

## 2021-03-03 NOTE — PROGRESS NOTES
Yodit to infusion for Venofer 1/5 for  Anemia during pregnancy in second trimester    She arrives ambulatory and reports feeling well at this time. Discuss medication profile, possible SE   Venofer administered over 5 minutes, tolerated well.   Observed f

## 2021-03-05 ENCOUNTER — OFFICE VISIT (OUTPATIENT)
Dept: HEMATOLOGY/ONCOLOGY | Facility: HOSPITAL | Age: 31
End: 2021-03-05
Attending: INTERNAL MEDICINE
Payer: COMMERCIAL

## 2021-03-05 VITALS
TEMPERATURE: 98 F | DIASTOLIC BLOOD PRESSURE: 65 MMHG | SYSTOLIC BLOOD PRESSURE: 134 MMHG | OXYGEN SATURATION: 98 % | HEART RATE: 100 BPM | RESPIRATION RATE: 16 BRPM

## 2021-03-05 DIAGNOSIS — O99.012 ANEMIA DURING PREGNANCY IN SECOND TRIMESTER: ICD-10-CM

## 2021-03-05 DIAGNOSIS — D50.9 IRON DEFICIENCY ANEMIA, UNSPECIFIED IRON DEFICIENCY ANEMIA TYPE: Primary | ICD-10-CM

## 2021-03-05 PROCEDURE — 96374 THER/PROPH/DIAG INJ IV PUSH: CPT

## 2021-03-05 NOTE — PROGRESS NOTES
Yodit to infusion today for Venofer 2 of 5. She arrives alert and independent. She is in 2nd trimester of pregnancy, getting iron infusions prior to  d/t having hemorrhaged with deliveries of first 2 children.  She reports a headache and feeling t

## 2021-03-09 ENCOUNTER — OFFICE VISIT (OUTPATIENT)
Dept: HEMATOLOGY/ONCOLOGY | Facility: HOSPITAL | Age: 31
End: 2021-03-09
Attending: INTERNAL MEDICINE
Payer: COMMERCIAL

## 2021-03-09 VITALS
TEMPERATURE: 98 F | SYSTOLIC BLOOD PRESSURE: 117 MMHG | DIASTOLIC BLOOD PRESSURE: 72 MMHG | RESPIRATION RATE: 16 BRPM | HEART RATE: 99 BPM | OXYGEN SATURATION: 100 %

## 2021-03-09 DIAGNOSIS — D50.9 IRON DEFICIENCY ANEMIA, UNSPECIFIED IRON DEFICIENCY ANEMIA TYPE: Primary | ICD-10-CM

## 2021-03-09 DIAGNOSIS — O99.012 ANEMIA DURING PREGNANCY IN SECOND TRIMESTER: ICD-10-CM

## 2021-03-09 PROCEDURE — 96374 THER/PROPH/DIAG INJ IV PUSH: CPT

## 2021-03-09 NOTE — PROGRESS NOTES
Yodit to infusion today for Venofer 3 of 5. She arrives alert and independent. She is in 2nd trimester of pregnancy (due in ), getting iron infusions prior to  d/t having hemorrhaged with deliveries of first 2 children.    Reports gissell t

## 2021-03-11 ENCOUNTER — OFFICE VISIT (OUTPATIENT)
Dept: HEMATOLOGY/ONCOLOGY | Facility: HOSPITAL | Age: 31
End: 2021-03-11
Attending: INTERNAL MEDICINE
Payer: COMMERCIAL

## 2021-03-11 VITALS
HEART RATE: 103 BPM | TEMPERATURE: 98 F | RESPIRATION RATE: 16 BRPM | DIASTOLIC BLOOD PRESSURE: 74 MMHG | OXYGEN SATURATION: 100 % | SYSTOLIC BLOOD PRESSURE: 116 MMHG

## 2021-03-11 DIAGNOSIS — O99.012 ANEMIA DURING PREGNANCY IN SECOND TRIMESTER: ICD-10-CM

## 2021-03-11 DIAGNOSIS — D50.9 IRON DEFICIENCY ANEMIA, UNSPECIFIED IRON DEFICIENCY ANEMIA TYPE: Primary | ICD-10-CM

## 2021-03-11 PROCEDURE — 96374 THER/PROPH/DIAG INJ IV PUSH: CPT

## 2021-03-11 NOTE — PROGRESS NOTES
Yodit to infusion today for Venofer 4 of 5. She arrives alert and independent. She is in 2nd trimester of pregnancy, getting iron infusions prior to  d/t having hemorrhaged with deliveries of first 2 children. Offers no new complaints today.

## 2021-03-15 ENCOUNTER — OFFICE VISIT (OUTPATIENT)
Dept: HEMATOLOGY/ONCOLOGY | Facility: HOSPITAL | Age: 31
End: 2021-03-15
Attending: INTERNAL MEDICINE
Payer: COMMERCIAL

## 2021-03-15 VITALS
SYSTOLIC BLOOD PRESSURE: 125 MMHG | HEART RATE: 102 BPM | DIASTOLIC BLOOD PRESSURE: 65 MMHG | RESPIRATION RATE: 16 BRPM | OXYGEN SATURATION: 100 % | TEMPERATURE: 98 F

## 2021-03-15 DIAGNOSIS — O99.012 ANEMIA DURING PREGNANCY IN SECOND TRIMESTER: ICD-10-CM

## 2021-03-15 DIAGNOSIS — D50.9 IRON DEFICIENCY ANEMIA, UNSPECIFIED IRON DEFICIENCY ANEMIA TYPE: Primary | ICD-10-CM

## 2021-03-15 PROCEDURE — 96374 THER/PROPH/DIAG INJ IV PUSH: CPT

## 2021-03-15 NOTE — PROGRESS NOTES
Yodit to infusion today for Venofer 5 of 5. She arrives alert and independent. She is in 2nd trimester of pregnancy, getting iron infusions prior to  d/t having hemorrhaged with deliveries of first 2 children. Offers no new complaints today.

## 2021-03-24 ENCOUNTER — PATIENT MESSAGE (OUTPATIENT)
Dept: OBGYN CLINIC | Facility: CLINIC | Age: 31
End: 2021-03-24

## 2021-03-24 ENCOUNTER — ROUTINE PRENATAL (OUTPATIENT)
Dept: OBGYN CLINIC | Facility: CLINIC | Age: 31
End: 2021-03-24
Payer: COMMERCIAL

## 2021-03-24 ENCOUNTER — ULTRASOUND ENCOUNTER (OUTPATIENT)
Dept: OBGYN CLINIC | Facility: CLINIC | Age: 31
End: 2021-03-24
Payer: COMMERCIAL

## 2021-03-24 ENCOUNTER — LAB ENCOUNTER (OUTPATIENT)
Dept: LAB | Age: 31
End: 2021-03-24
Attending: OBSTETRICS & GYNECOLOGY
Payer: COMMERCIAL

## 2021-03-24 VITALS
WEIGHT: 196.81 LBS | SYSTOLIC BLOOD PRESSURE: 122 MMHG | HEIGHT: 67 IN | DIASTOLIC BLOOD PRESSURE: 80 MMHG | BODY MASS INDEX: 30.89 KG/M2

## 2021-03-24 DIAGNOSIS — Z36.9 UNSPECIFIED ANTENATAL SCREENING: Primary | ICD-10-CM

## 2021-03-24 DIAGNOSIS — O44.00 PLACENTA PREVIA WITHOUT HEMORRHAGE, ANTEPARTUM: ICD-10-CM

## 2021-03-24 DIAGNOSIS — Z36.9 UNSPECIFIED ANTENATAL SCREENING: ICD-10-CM

## 2021-03-24 DIAGNOSIS — O99.013 ANEMIA DURING PREGNANCY IN THIRD TRIMESTER: ICD-10-CM

## 2021-03-24 DIAGNOSIS — Z34.83 MULTIGRAVIDA IN THIRD TRIMESTER: ICD-10-CM

## 2021-03-24 LAB
DEPRECATED RDW RBC AUTO: 54 FL (ref 35.1–46.3)
ERYTHROCYTE [DISTWIDTH] IN BLOOD BY AUTOMATED COUNT: 20.6 % (ref 11–15)
GLUCOSE 1H P GLC SERPL-MCNC: 108 MG/DL
HCT VFR BLD AUTO: 30.9 %
HGB BLD-MCNC: 9.1 G/DL
MCH RBC QN AUTO: 22 PG (ref 26–34)
MCHC RBC AUTO-ENTMCNC: 29.4 G/DL (ref 31–37)
MCV RBC AUTO: 74.6 FL
MULTISTIX LOT#: 5077 NUMERIC
PLATELET # BLD AUTO: 252 10(3)UL (ref 150–450)
RBC # BLD AUTO: 4.14 X10(6)UL
WBC # BLD AUTO: 6.7 X10(3) UL (ref 4–11)

## 2021-03-24 PROCEDURE — 3074F SYST BP LT 130 MM HG: CPT | Performed by: OBSTETRICS & GYNECOLOGY

## 2021-03-24 PROCEDURE — 85027 COMPLETE CBC AUTOMATED: CPT

## 2021-03-24 PROCEDURE — 3079F DIAST BP 80-89 MM HG: CPT | Performed by: OBSTETRICS & GYNECOLOGY

## 2021-03-24 PROCEDURE — 90715 TDAP VACCINE 7 YRS/> IM: CPT | Performed by: OBSTETRICS & GYNECOLOGY

## 2021-03-24 PROCEDURE — 36415 COLL VENOUS BLD VENIPUNCTURE: CPT

## 2021-03-24 PROCEDURE — 90471 IMMUNIZATION ADMIN: CPT | Performed by: OBSTETRICS & GYNECOLOGY

## 2021-03-24 PROCEDURE — 3008F BODY MASS INDEX DOCD: CPT | Performed by: OBSTETRICS & GYNECOLOGY

## 2021-03-24 PROCEDURE — 82950 GLUCOSE TEST: CPT

## 2021-03-24 PROCEDURE — 76816 OB US FOLLOW-UP PER FETUS: CPT | Performed by: OBSTETRICS & GYNECOLOGY

## 2021-03-24 PROCEDURE — 81002 URINALYSIS NONAUTO W/O SCOPE: CPT | Performed by: OBSTETRICS & GYNECOLOGY

## 2021-03-24 NOTE — PROGRESS NOTES
Tdap Vaccine administered in Left arm IM. Patient is 27w4d pregnant. Patient tolerated injection well no reaction at this time. 2 patient identifiers verified with pt. Ordering Dr. Ce Ramos.

## 2021-03-24 NOTE — PROGRESS NOTES
Here for RHONDA - active FM and no bleeding. Completed weekly IV Venofer x 5 infusions from 3/5 to 3/15/21 with planned follow up visit in April with Dr. Chapin Gonzalez.  USN for previa evaluation completed today = 27.4w 1319g(2#15oz) 73% with posterior complete previa n

## 2021-03-25 NOTE — TELEPHONE ENCOUNTER
Result Notes and Patient Communication  Comment seen by patient Zahra Hernandez on 3/24/2021  7:03 PM CDT  Back to Top    Your lab results show normal GTT and improved anemia        Laure Moya MD   Written by Deneen Baltazar MD on 3/24/2021  7:02

## 2021-04-07 ENCOUNTER — ROUTINE PRENATAL (OUTPATIENT)
Dept: OBGYN CLINIC | Facility: CLINIC | Age: 31
End: 2021-04-07
Payer: COMMERCIAL

## 2021-04-07 VITALS
HEIGHT: 67 IN | SYSTOLIC BLOOD PRESSURE: 122 MMHG | BODY MASS INDEX: 30.89 KG/M2 | WEIGHT: 196.81 LBS | DIASTOLIC BLOOD PRESSURE: 80 MMHG

## 2021-04-07 DIAGNOSIS — Z36.9 UNSPECIFIED ANTENATAL SCREENING: Primary | ICD-10-CM

## 2021-04-07 DIAGNOSIS — O44.00 PLACENTA PREVIA WITHOUT HEMORRHAGE, ANTEPARTUM: ICD-10-CM

## 2021-04-07 DIAGNOSIS — O99.013 ANEMIA DURING PREGNANCY IN THIRD TRIMESTER: ICD-10-CM

## 2021-04-07 PROCEDURE — 3008F BODY MASS INDEX DOCD: CPT | Performed by: OBSTETRICS & GYNECOLOGY

## 2021-04-07 PROCEDURE — 3079F DIAST BP 80-89 MM HG: CPT | Performed by: OBSTETRICS & GYNECOLOGY

## 2021-04-07 PROCEDURE — 81002 URINALYSIS NONAUTO W/O SCOPE: CPT | Performed by: OBSTETRICS & GYNECOLOGY

## 2021-04-07 PROCEDURE — 3074F SYST BP LT 130 MM HG: CPT | Performed by: OBSTETRICS & GYNECOLOGY

## 2021-04-08 ENCOUNTER — TELEPHONE (OUTPATIENT)
Dept: OBGYN CLINIC | Facility: CLINIC | Age: 31
End: 2021-04-08

## 2021-04-08 NOTE — TELEPHONE ENCOUNTER
Pt asked for [place]. Informed pt Yolanda was not availlable at the time. Pt stated she was calling to confirm paperwork for FMLA received.

## 2021-04-08 NOTE — TELEPHONE ENCOUNTER
This RN received [place] paperwork from [place] and gave it to Pastor Willis, logged in binder. Pt verbalized understanding and agrees with POC.

## 2021-04-09 ENCOUNTER — OFFICE VISIT (OUTPATIENT)
Dept: HEMATOLOGY/ONCOLOGY | Facility: HOSPITAL | Age: 31
End: 2021-04-09
Attending: INTERNAL MEDICINE

## 2021-04-09 VITALS
RESPIRATION RATE: 16 BRPM | TEMPERATURE: 99 F | WEIGHT: 195 LBS | HEART RATE: 105 BPM | OXYGEN SATURATION: 99 % | DIASTOLIC BLOOD PRESSURE: 73 MMHG | BODY MASS INDEX: 30.61 KG/M2 | HEIGHT: 67 IN | SYSTOLIC BLOOD PRESSURE: 124 MMHG

## 2021-04-09 DIAGNOSIS — D50.9 IRON DEFICIENCY ANEMIA, UNSPECIFIED IRON DEFICIENCY ANEMIA TYPE: ICD-10-CM

## 2021-04-09 DIAGNOSIS — O99.012 ANEMIA DURING PREGNANCY IN SECOND TRIMESTER: ICD-10-CM

## 2021-04-09 DIAGNOSIS — Z51.81 ENCOUNTER FOR MEDICATION MONITORING: ICD-10-CM

## 2021-04-09 DIAGNOSIS — O99.013 ANEMIA DURING PREGNANCY IN THIRD TRIMESTER: Primary | ICD-10-CM

## 2021-04-09 PROCEDURE — 84466 ASSAY OF TRANSFERRIN: CPT

## 2021-04-09 PROCEDURE — 85025 COMPLETE CBC W/AUTO DIFF WBC: CPT

## 2021-04-09 PROCEDURE — 82728 ASSAY OF FERRITIN: CPT

## 2021-04-09 PROCEDURE — 83540 ASSAY OF IRON: CPT

## 2021-04-09 PROCEDURE — 99214 OFFICE O/P EST MOD 30 MIN: CPT | Performed by: INTERNAL MEDICINE

## 2021-04-09 PROCEDURE — 36415 COLL VENOUS BLD VENIPUNCTURE: CPT

## 2021-04-09 NOTE — PROGRESS NOTES
TANNA   Zahra Hernandez is a 27year old female who is here today for follow up of Anemia during pregnancy in third trimester  (primary encounter diagnosis)  Iron deficiency anemia, unspecified iron deficiency anemia type  Encounter for medication monitori Ointment Apply topically 2 (two) times daily. • Prenatal MV-Min-FA-Omega-3 (PRENATAL GUMMIES/DHA & FA) 0.4-32.5 MG Oral Chew Tab Chew by mouth. • acetaminophen 500 MG Oral Tab Take by mouth.  (Patient not taking: Reported on 3/24/2021 )       Madelaine Mcclain regions.   Psych/Depression: nl        ASSESSMENT/PLAN:   Anemia during pregnancy in third trimester  (primary encounter diagnosis)  Iron deficiency anemia, unspecified iron deficiency anemia type  Encounter for medication monitoring    Some improvement aft Imaging & Referrals:  None   No orders of the defined types were placed in this encounter.

## 2021-04-12 NOTE — TELEPHONE ENCOUNTER
Spoke with patient and informed her that her [facility] paperwork is completed and will fax today. Patient verbalized understanding.

## 2021-04-19 ENCOUNTER — OFFICE VISIT (OUTPATIENT)
Dept: HEMATOLOGY/ONCOLOGY | Facility: HOSPITAL | Age: 31
End: 2021-04-19
Attending: INTERNAL MEDICINE
Payer: COMMERCIAL

## 2021-04-19 VITALS
SYSTOLIC BLOOD PRESSURE: 118 MMHG | RESPIRATION RATE: 16 BRPM | OXYGEN SATURATION: 98 % | HEART RATE: 108 BPM | DIASTOLIC BLOOD PRESSURE: 70 MMHG | TEMPERATURE: 98 F

## 2021-04-19 DIAGNOSIS — D50.9 IRON DEFICIENCY ANEMIA, UNSPECIFIED IRON DEFICIENCY ANEMIA TYPE: Primary | ICD-10-CM

## 2021-04-19 DIAGNOSIS — O99.013 ANEMIA DURING PREGNANCY IN THIRD TRIMESTER: ICD-10-CM

## 2021-04-19 PROCEDURE — 96374 THER/PROPH/DIAG INJ IV PUSH: CPT

## 2021-04-19 NOTE — PROGRESS NOTES
Returns for a set of 5 doses of venofer 200mg ordered for anemia in the 3rd trimester. Has had venofer in the past, reviewed plan of care with Yodit. 30 minute post observation and potential s/e. All questions answered to the best of my ability.     PIV e

## 2021-04-20 NOTE — TELEPHONE ENCOUNTER
Pt states her employer is saying they never received the fax. Today is the last day to receive FMLA.

## 2021-04-21 ENCOUNTER — OFFICE VISIT (OUTPATIENT)
Dept: HEMATOLOGY/ONCOLOGY | Facility: HOSPITAL | Age: 31
End: 2021-04-21
Attending: INTERNAL MEDICINE
Payer: COMMERCIAL

## 2021-04-21 ENCOUNTER — TELEPHONE (OUTPATIENT)
Dept: OBGYN CLINIC | Facility: CLINIC | Age: 31
End: 2021-04-21

## 2021-04-21 VITALS
RESPIRATION RATE: 16 BRPM | SYSTOLIC BLOOD PRESSURE: 135 MMHG | HEART RATE: 105 BPM | TEMPERATURE: 98 F | DIASTOLIC BLOOD PRESSURE: 71 MMHG

## 2021-04-21 DIAGNOSIS — D50.9 IRON DEFICIENCY ANEMIA, UNSPECIFIED IRON DEFICIENCY ANEMIA TYPE: Primary | ICD-10-CM

## 2021-04-21 DIAGNOSIS — O99.013 ANEMIA DURING PREGNANCY IN THIRD TRIMESTER: ICD-10-CM

## 2021-04-21 PROCEDURE — 96374 THER/PROPH/DIAG INJ IV PUSH: CPT

## 2021-04-21 NOTE — PROGRESS NOTES
Returns for Venofer 2/5  ordered for anemia in the 3rd trimester. Has had venofer in the past, reviewed plan of care with Yodit. PIV established with brisk blood return noted. venofer 200mg iv push slow with + blood return noted throughout.  No advers

## 2021-04-21 NOTE — TELEPHONE ENCOUNTER
This RN discussed with [facility], [address] [place] [address] Millie. Pt to come in for US on 4/26/21 at 1130am and then then RHONDA with EB at 1215pm. This RN also strongly encouraged pt to schedule RHONDA appmnt with other providers. Pt verbalized understanding and agrees with POC.

## 2021-04-21 NOTE — TELEPHONE ENCOUNTER
Pt has an upcoming appt for ultrasound for 4/26 at 12:30. Pt states Dr. Ce Ramos told her she would like to see her the same day of the US to discuss the delivery plan.  Pt states she did stop by the  to schedule her US and Dr. Carly Marshall but somehow the

## 2021-04-23 ENCOUNTER — OFFICE VISIT (OUTPATIENT)
Dept: HEMATOLOGY/ONCOLOGY | Facility: HOSPITAL | Age: 31
End: 2021-04-23
Attending: INTERNAL MEDICINE
Payer: COMMERCIAL

## 2021-04-23 VITALS
TEMPERATURE: 98 F | HEART RATE: 113 BPM | OXYGEN SATURATION: 98 % | RESPIRATION RATE: 16 BRPM | SYSTOLIC BLOOD PRESSURE: 116 MMHG | DIASTOLIC BLOOD PRESSURE: 73 MMHG

## 2021-04-23 DIAGNOSIS — D50.9 IRON DEFICIENCY ANEMIA, UNSPECIFIED IRON DEFICIENCY ANEMIA TYPE: Primary | ICD-10-CM

## 2021-04-23 DIAGNOSIS — O99.013 ANEMIA DURING PREGNANCY IN THIRD TRIMESTER: ICD-10-CM

## 2021-04-23 PROCEDURE — 96374 THER/PROPH/DIAG INJ IV PUSH: CPT

## 2021-04-23 NOTE — PROGRESS NOTES
Returns for Venofer 3/5  ordered for anemia in the 3rd trimester. Has had venofer in the past and tolerated well, reviewed plan of care with Yodit. PIV established to left ac with brisk blood return noted.  Venofer 200mg iv push over 5 minutes with +

## 2021-04-26 ENCOUNTER — ROUTINE PRENATAL (OUTPATIENT)
Dept: OBGYN CLINIC | Facility: CLINIC | Age: 31
End: 2021-04-26
Payer: COMMERCIAL

## 2021-04-26 ENCOUNTER — ULTRASOUND ENCOUNTER (OUTPATIENT)
Dept: OBGYN CLINIC | Facility: CLINIC | Age: 31
End: 2021-04-26
Payer: COMMERCIAL

## 2021-04-26 VITALS
SYSTOLIC BLOOD PRESSURE: 118 MMHG | HEIGHT: 67 IN | WEIGHT: 196.63 LBS | DIASTOLIC BLOOD PRESSURE: 74 MMHG | BODY MASS INDEX: 30.86 KG/M2

## 2021-04-26 DIAGNOSIS — O44.00 PLACENTA PREVIA WITHOUT HEMORRHAGE, ANTEPARTUM: Primary | ICD-10-CM

## 2021-04-26 DIAGNOSIS — O44.00 PLACENTA PREVIA WITHOUT HEMORRHAGE, ANTEPARTUM: ICD-10-CM

## 2021-04-26 DIAGNOSIS — Z36.9 UNSPECIFIED ANTENATAL SCREENING: Primary | ICD-10-CM

## 2021-04-26 DIAGNOSIS — Z34.83 MULTIGRAVIDA IN THIRD TRIMESTER: ICD-10-CM

## 2021-04-26 PROBLEM — R76.0 ELEVATED ANTIBODY LEVELS: Status: RESOLVED | Noted: 2020-12-02 | Resolved: 2021-04-26

## 2021-04-26 PROCEDURE — 76816 OB US FOLLOW-UP PER FETUS: CPT | Performed by: OBSTETRICS & GYNECOLOGY

## 2021-04-26 PROCEDURE — 3078F DIAST BP <80 MM HG: CPT | Performed by: OBSTETRICS & GYNECOLOGY

## 2021-04-26 PROCEDURE — 3074F SYST BP LT 130 MM HG: CPT | Performed by: OBSTETRICS & GYNECOLOGY

## 2021-04-26 PROCEDURE — 3008F BODY MASS INDEX DOCD: CPT | Performed by: OBSTETRICS & GYNECOLOGY

## 2021-04-26 NOTE — PROGRESS NOTES
Here after USN today = placenta remains a complete posterior previa but no longer central. Aware of need for primary LTCS at 37-38w and need for continued pelvic rest and decreased activities. Has not experienced any bleeding.  Undergoing Venofer infusions

## 2021-04-27 ENCOUNTER — OFFICE VISIT (OUTPATIENT)
Dept: HEMATOLOGY/ONCOLOGY | Facility: HOSPITAL | Age: 31
End: 2021-04-27
Attending: INTERNAL MEDICINE
Payer: COMMERCIAL

## 2021-04-27 VITALS
TEMPERATURE: 98 F | HEART RATE: 108 BPM | RESPIRATION RATE: 18 BRPM | SYSTOLIC BLOOD PRESSURE: 133 MMHG | DIASTOLIC BLOOD PRESSURE: 73 MMHG

## 2021-04-27 DIAGNOSIS — O99.013 ANEMIA DURING PREGNANCY IN THIRD TRIMESTER: ICD-10-CM

## 2021-04-27 DIAGNOSIS — D50.9 IRON DEFICIENCY ANEMIA, UNSPECIFIED IRON DEFICIENCY ANEMIA TYPE: Primary | ICD-10-CM

## 2021-04-27 PROCEDURE — 96374 THER/PROPH/DIAG INJ IV PUSH: CPT

## 2021-04-27 NOTE — PROGRESS NOTES
Yodit to infusion today for Venofer 4 of 5. She arrives alert and independent. She is in 3rd trimester of pregnancy, getting iron infusions prior to  planned for early . PIV started to left [facility] with positive blood return noted.  Venofer give

## 2021-04-29 ENCOUNTER — OFFICE VISIT (OUTPATIENT)
Dept: HEMATOLOGY/ONCOLOGY | Facility: HOSPITAL | Age: 31
End: 2021-04-29
Attending: INTERNAL MEDICINE
Payer: COMMERCIAL

## 2021-04-29 VITALS
HEART RATE: 110 BPM | OXYGEN SATURATION: 100 % | DIASTOLIC BLOOD PRESSURE: 70 MMHG | SYSTOLIC BLOOD PRESSURE: 121 MMHG | RESPIRATION RATE: 16 BRPM | TEMPERATURE: 98 F

## 2021-04-29 DIAGNOSIS — D50.9 IRON DEFICIENCY ANEMIA, UNSPECIFIED IRON DEFICIENCY ANEMIA TYPE: Primary | ICD-10-CM

## 2021-04-29 DIAGNOSIS — O99.013 ANEMIA DURING PREGNANCY IN THIRD TRIMESTER: ICD-10-CM

## 2021-04-29 PROCEDURE — 96374 THER/PROPH/DIAG INJ IV PUSH: CPT

## 2021-04-29 NOTE — PROGRESS NOTES
Yodit to infusion today for Venofer 5 of 5. She arrives alert and independent. She is in 3rd trimester of pregnancy, getting iron infusions prior to  planned for early . PIV started to right [facility] with positive blood return noted.  Venofer giv

## 2021-05-06 ENCOUNTER — TELEPHONE (OUTPATIENT)
Dept: OBGYN CLINIC | Facility: CLINIC | Age: 31
End: 2021-05-06

## 2021-05-06 ENCOUNTER — ROUTINE PRENATAL (OUTPATIENT)
Dept: OBGYN CLINIC | Facility: CLINIC | Age: 31
End: 2021-05-06
Payer: COMMERCIAL

## 2021-05-06 VITALS
HEIGHT: 67 IN | SYSTOLIC BLOOD PRESSURE: 110 MMHG | BODY MASS INDEX: 31.12 KG/M2 | WEIGHT: 198.31 LBS | DIASTOLIC BLOOD PRESSURE: 70 MMHG

## 2021-05-06 DIAGNOSIS — Z36.9 UNSPECIFIED ANTENATAL SCREENING: Primary | ICD-10-CM

## 2021-05-06 PROCEDURE — 3074F SYST BP LT 130 MM HG: CPT | Performed by: OBSTETRICS & GYNECOLOGY

## 2021-05-06 PROCEDURE — 3008F BODY MASS INDEX DOCD: CPT | Performed by: OBSTETRICS & GYNECOLOGY

## 2021-05-06 PROCEDURE — 3078F DIAST BP <80 MM HG: CPT | Performed by: OBSTETRICS & GYNECOLOGY

## 2021-05-06 NOTE — PROGRESS NOTES
Ivan Stapleton  Pt is a 27year old [id number] at 33w5d   Doing well. No complaints. Denies LOF/VB/uctx. +FM.    Rh +, TDAP received  Scheduled primary c section on 6/1/21 at 1130 am.      BPM   FH 33 cm     RTC in 1 week with Dr. Jessee Manning      PTL and Fetal mov

## 2021-05-09 ENCOUNTER — APPOINTMENT (OUTPATIENT)
Dept: ULTRASOUND IMAGING | Age: 31
DRG: 833 | End: 2021-05-09
Attending: OBSTETRICS & GYNECOLOGY

## 2021-05-09 ENCOUNTER — HOSPITAL ENCOUNTER (INPATIENT)
Age: 31
LOS: 2 days | Discharge: HOME | DRG: 833 | End: 2021-05-12
Attending: OBSTETRICS & GYNECOLOGY | Admitting: OBSTETRICS & GYNECOLOGY

## 2021-05-09 ENCOUNTER — MOBILE ENCOUNTER (OUTPATIENT)
Dept: OBGYN CLINIC | Facility: CLINIC | Age: 31
End: 2021-05-09

## 2021-05-09 LAB
BASOPHILS # BLD: 0 K/MCL (ref 0–0.3)
BASOPHILS NFR BLD: 1 %
DEPRECATED RDW RBC: 56.2 FL (ref 39–50)
EOSINOPHIL # BLD: 0.2 K/MCL (ref 0–0.5)
EOSINOPHIL NFR BLD: 3 %
ERYTHROCYTE [DISTWIDTH] IN BLOOD: 19.9 % (ref 11–15)
HCT VFR BLD CALC: 33.3 % (ref 36–46.5)
HGB BLD-MCNC: 10.2 G/DL (ref 12–15.5)
IMM GRANULOCYTES # BLD AUTO: 0 K/MCL (ref 0–0.2)
IMM GRANULOCYTES # BLD: 1 %
LYMPHOCYTES # BLD: 2.2 K/MCL (ref 1–4.8)
LYMPHOCYTES NFR BLD: 28 %
MCH RBC QN AUTO: 23.9 PG (ref 26–34)
MCHC RBC AUTO-ENTMCNC: 30.6 G/DL (ref 32–36.5)
MCV RBC AUTO: 78.2 FL (ref 78–100)
MONOCYTES # BLD: 0.6 K/MCL (ref 0.3–0.9)
MONOCYTES NFR BLD: 7 %
NEUTROPHILS # BLD: 4.9 K/MCL (ref 1.8–7.7)
NEUTROPHILS NFR BLD: 60 %
NRBC BLD MANUAL-RTO: 0 /100 WBC
PLATELET # BLD AUTO: 187 K/MCL (ref 140–450)
RBC # BLD: 4.26 MIL/MCL (ref 4–5.2)
WBC # BLD: 8 K/MCL (ref 4.2–11)

## 2021-05-09 PROCEDURE — 86900 BLOOD TYPING SEROLOGIC ABO: CPT | Performed by: OBSTETRICS & GYNECOLOGY

## 2021-05-09 PROCEDURE — 10002800 HB RX 250 W HCPCS

## 2021-05-09 PROCEDURE — 76815 OB US LIMITED FETUS(S): CPT

## 2021-05-09 PROCEDURE — 85384 FIBRINOGEN ACTIVITY: CPT | Performed by: OBSTETRICS & GYNECOLOGY

## 2021-05-09 PROCEDURE — 80053 COMPREHEN METABOLIC PANEL: CPT | Performed by: OBSTETRICS & GYNECOLOGY

## 2021-05-09 PROCEDURE — 10002807 HB RX 258: Performed by: OBSTETRICS & GYNECOLOGY

## 2021-05-09 PROCEDURE — 85025 COMPLETE CBC W/AUTO DIFF WBC: CPT | Performed by: OBSTETRICS & GYNECOLOGY

## 2021-05-09 PROCEDURE — 36415 COLL VENOUS BLD VENIPUNCTURE: CPT | Performed by: OBSTETRICS & GYNECOLOGY

## 2021-05-09 PROCEDURE — 85730 THROMBOPLASTIN TIME PARTIAL: CPT | Performed by: OBSTETRICS & GYNECOLOGY

## 2021-05-09 RX ORDER — VITAMIN A ACETATE, BETA CAROTENE, ASCORBIC ACID, CHOLECALCIFEROL, .ALPHA.-TOCOPHEROL ACETATE, DL-, THIAMINE MONONITRATE, RIBOFLAVIN, NIACINAMIDE, PYRIDOXINE HYDROCHLORIDE, FOLIC ACID, CYANOCOBALAMIN, CALCIUM CARBONATE, FERROUS FUMARATE, ZINC OXIDE, CUPRIC OXIDE 3080; 12; 120; 400; 1; 1.84; 3; 20; 22; 920; 25; 200; 27; 10; 2 [IU]/1; UG/1; MG/1; [IU]/1; MG/1; MG/1; MG/1; MG/1; MG/1; [IU]/1; MG/1; MG/1; MG/1; MG/1; MG/1
1 TABLET, FILM COATED ORAL DAILY
COMMUNITY

## 2021-05-09 RX ORDER — BETAMETHASONE SODIUM PHOSPHATE AND BETAMETHASONE ACETATE 3; 3 MG/ML; MG/ML
6 INJECTION, SUSPENSION INTRA-ARTICULAR; INTRALESIONAL; INTRAMUSCULAR; SOFT TISSUE EVERY 12 HOURS
Status: DISCONTINUED | OUTPATIENT
Start: 2021-05-09 | End: 2021-05-09

## 2021-05-09 RX ORDER — BETAMETHASONE SODIUM PHOSPHATE AND BETAMETHASONE ACETATE 3; 3 MG/ML; MG/ML
INJECTION, SUSPENSION INTRA-ARTICULAR; INTRALESIONAL; INTRAMUSCULAR; SOFT TISSUE
Status: COMPLETED
Start: 2021-05-09 | End: 2021-05-09

## 2021-05-09 RX ORDER — ALBUTEROL SULFATE 90 UG/1
2 AEROSOL, METERED RESPIRATORY (INHALATION) EVERY 4 HOURS PRN
COMMUNITY

## 2021-05-09 RX ORDER — MONTELUKAST SODIUM 10 MG/1
10 TABLET ORAL
COMMUNITY

## 2021-05-09 RX ORDER — FLUTICASONE PROPIONATE 220 UG/1
1 AEROSOL, METERED RESPIRATORY (INHALATION) 2 TIMES DAILY PRN
COMMUNITY

## 2021-05-09 RX ORDER — BETAMETHASONE SODIUM PHOSPHATE AND BETAMETHASONE ACETATE 3; 3 MG/ML; MG/ML
12 INJECTION, SUSPENSION INTRA-ARTICULAR; INTRALESIONAL; INTRAMUSCULAR; SOFT TISSUE EVERY 24 HOURS
Status: COMPLETED | OUTPATIENT
Start: 2021-05-10 | End: 2021-05-10

## 2021-05-09 RX ORDER — SODIUM CHLORIDE, SODIUM LACTATE, POTASSIUM CHLORIDE, CALCIUM CHLORIDE 600; 310; 30; 20 MG/100ML; MG/100ML; MG/100ML; MG/100ML
INJECTION, SOLUTION INTRAVENOUS CONTINUOUS
Status: DISCONTINUED | OUTPATIENT
Start: 2021-05-09 | End: 2021-05-12 | Stop reason: HOSPADM

## 2021-05-09 RX ADMIN — SODIUM CHLORIDE, POTASSIUM CHLORIDE, SODIUM LACTATE AND CALCIUM CHLORIDE: 600; 310; 30; 20 INJECTION, SOLUTION INTRAVENOUS at 23:30

## 2021-05-09 RX ADMIN — BETAMETHASONE SODIUM PHOSPHATE AND BETAMETHASONE ACETATE 12 MG: 3; 3 INJECTION, SUSPENSION INTRA-ARTICULAR; INTRALESIONAL; INTRAMUSCULAR at 23:40

## 2021-05-09 ASSESSMENT — PAIN SCALES - GENERAL: PAINLEVEL_OUTOF10: 3

## 2021-05-10 ENCOUNTER — TELEPHONE (OUTPATIENT)
Dept: OBGYN CLINIC | Facility: CLINIC | Age: 31
End: 2021-05-10

## 2021-05-10 ENCOUNTER — APPOINTMENT (OUTPATIENT)
Dept: ULTRASOUND IMAGING | Age: 31
DRG: 833 | End: 2021-05-10
Attending: OBSTETRICS & GYNECOLOGY

## 2021-05-10 LAB
ABO + RH BLD: NORMAL
ALBUMIN SERPL-MCNC: 2.5 G/DL (ref 3.6–5.1)
ALBUMIN/GLOB SERPL: 0.6 {RATIO} (ref 1–2.4)
ALP SERPL-CCNC: 86 UNITS/L (ref 45–117)
ALT SERPL-CCNC: 18 UNITS/L
ANION GAP SERPL CALC-SCNC: 7 MMOL/L (ref 10–20)
APTT PPP: 29 SEC (ref 22–30)
AST SERPL-CCNC: 8 UNITS/L
BILIRUB SERPL-MCNC: 0.2 MG/DL (ref 0.2–1)
BLD GP AB SCN SERPL QL GEL: NEGATIVE
BUN SERPL-MCNC: 5 MG/DL (ref 6–20)
BUN/CREAT SERPL: 9 (ref 7–25)
CALCIUM SERPL-MCNC: 9.3 MG/DL (ref 8.4–10.2)
CHLORIDE SERPL-SCNC: 110 MMOL/L (ref 98–107)
CO2 SERPL-SCNC: 24 MMOL/L (ref 21–32)
CREAT SERPL-MCNC: 0.53 MG/DL (ref 0.51–0.95)
FASTING DURATION TIME PATIENT: ABNORMAL H
GFR SERPLBLD BASED ON 1.73 SQ M-ARVRAT: >90 ML/MIN/1.73M2
GLOBULIN SER-MCNC: 4.2 G/DL (ref 2–4)
GLUCOSE SERPL-MCNC: 98 MG/DL (ref 65–99)
POTASSIUM SERPL-SCNC: 4.2 MMOL/L (ref 3.4–5.1)
PROT SERPL-MCNC: 6.7 G/DL (ref 6.4–8.2)
SARS-COV-2 RNA RESP QL NAA+PROBE: NOT DETECTED
SERVICE CMNT-IMP: 509 MG/DL (ref 190–425)
SERVICE CMNT-IMP: NORMAL
SERVICE CMNT-IMP: NORMAL
SODIUM SERPL-SCNC: 137 MMOL/L (ref 135–145)
TYPE AND SCREEN EXPIRATION DATE: NORMAL

## 2021-05-10 PROCEDURE — 13003286 HB ROOM CHARGE L&D

## 2021-05-10 PROCEDURE — 99252 IP/OBS CONSLTJ NEW/EST SF 35: CPT | Performed by: OBSTETRICS & GYNECOLOGY

## 2021-05-10 PROCEDURE — 10002803 HB RX 637: Performed by: OBSTETRICS & GYNECOLOGY

## 2021-05-10 PROCEDURE — 76815 OB US LIMITED FETUS(S): CPT

## 2021-05-10 PROCEDURE — 10004651 HB RX, NO CHARGE ITEM: Performed by: OBSTETRICS & GYNECOLOGY

## 2021-05-10 PROCEDURE — 87635 SARS-COV-2 COVID-19 AMP PRB: CPT | Performed by: OBSTETRICS & GYNECOLOGY

## 2021-05-10 PROCEDURE — 10002807 HB RX 258: Performed by: OBSTETRICS & GYNECOLOGY

## 2021-05-10 PROCEDURE — 59025 FETAL NON-STRESS TEST: CPT | Performed by: OBSTETRICS & GYNECOLOGY

## 2021-05-10 PROCEDURE — 10002800 HB RX 250 W HCPCS: Performed by: OBSTETRICS & GYNECOLOGY

## 2021-05-10 RX ORDER — ACETAMINOPHEN 325 MG/1
TABLET ORAL
Status: COMPLETED
Start: 2021-05-10 | End: 2021-05-10

## 2021-05-10 RX ORDER — ACETAMINOPHEN 325 MG/1
650 TABLET ORAL EVERY 6 HOURS PRN
Status: DISCONTINUED | OUTPATIENT
Start: 2021-05-10 | End: 2021-05-12 | Stop reason: HOSPADM

## 2021-05-10 RX ORDER — DOCUSATE SODIUM 100 MG/1
100 CAPSULE, LIQUID FILLED ORAL 2 TIMES DAILY
Status: DISCONTINUED | OUTPATIENT
Start: 2021-05-10 | End: 2021-05-12 | Stop reason: HOSPADM

## 2021-05-10 RX ADMIN — SODIUM CHLORIDE, POTASSIUM CHLORIDE, SODIUM LACTATE AND CALCIUM CHLORIDE: 600; 310; 30; 20 INJECTION, SOLUTION INTRAVENOUS at 07:17

## 2021-05-10 RX ADMIN — BETAMETHASONE SODIUM PHOSPHATE AND BETAMETHASONE ACETATE 12 MG: 3; 3 INJECTION, SUSPENSION INTRA-ARTICULAR; INTRALESIONAL; INTRAMUSCULAR at 23:43

## 2021-05-10 RX ADMIN — ACETAMINOPHEN 650 MG: 325 TABLET ORAL at 19:34

## 2021-05-10 RX ADMIN — DOCUSATE SODIUM 100 MG: 100 CAPSULE, LIQUID FILLED ORAL at 16:30

## 2021-05-10 ASSESSMENT — LIFESTYLE VARIABLES
LAST DRINK YOU HAD: DENIES INTAKE
HOW OFTEN DO YOU HAVE A DRINK CONTAINING ALCOHOL: NEVER
AUDIT-C TOTAL SCORE: 0
ARE YOU BLIND OR DO YOU HAVE SERIOUS DIFFICULTY SEEING, EVEN WHEN WEARING GLASSES: NO
ALCOHOL_USE_STATUS: NO OR LOW RISK WITH VALIDATED TOOL
CHRONIC/CANCER PAIN PRESENT: NO
HISTORY OF PROBLEMS WHEN YOU STOP DRINKING ALCOHOL: NO
ADL NEEDS ASSIST: NO
SHORT OF BREATH OR FATIGUE WITH ADLS: NO
RECENTLY LOST WEIGHT WITHOUT TRYING: 0
HOW MANY STANDARD DRINKS CONTAINING ALCOHOL DO YOU HAVE ON A TYPICAL DAY: 0,1 OR 2
RECENT DECLINE IN ADLS: NO
IS PATIENT ABLE TO COMPLETE ASSESSMENT AT THIS TIME: YES
HOW OFTEN DO YOU HAVE 6 OR MORE DRINKS ON ONE OCCASION: NEVER
ADL BEFORE ADMISSION: INDEPENDENT
ARE YOU DEAF OR DO YOU HAVE SERIOUS DIFFICULTY  HEARING: NO
HAVE YOU BEEN EATING POORLY BECAUSE OF A DECREASED APPETITE: 0

## 2021-05-10 ASSESSMENT — PAIN SCALES - GENERAL
PAINLEVEL_OUTOF10: 5
PAINLEVEL_OUTOF10: 0
PAINLEVEL_OUTOF10: 5
PAINLEVEL_OUTOF10: 0
PAINLEVEL_OUTOF10: 0
PAINLEVEL_OUTOF10: 5
PAINLEVEL_OUTOF10: 0

## 2021-05-10 ASSESSMENT — COGNITIVE AND FUNCTIONAL STATUS - GENERAL
ARE YOU BLIND OR DO YOU HAVE SERIOUS DIFFICULTY SEEING, EVEN WHEN WEARING GLASSES: NO
BECAUSE OF A PHYSICAL, MENTAL, OR EMOTIONAL CONDITION, DO YOU HAVE SERIOUS DIFFICULTY CONCENTRATING, REMEMBERING OR MAKING DECISIONS: NO
DO YOU HAVE SERIOUS DIFFICULTY WALKING OR CLIMBING STAIRS: NO
BECAUSE OF A PHYSICAL, MENTAL, OR EMOTIONAL CONDITION, DO YOU HAVE DIFFICULTY DOING ERRANDS ALONE: NO
DO YOU HAVE DIFFICULTY DRESSING OR BATHING: NO

## 2021-05-10 ASSESSMENT — ACTIVITIES OF DAILY LIVING (ADL)
ADL_SCORE: 12
CHRONIC_PAIN_PRESENT: NO

## 2021-05-10 NOTE — TELEPHONE ENCOUNTER
Pt was rushed to the hospital due to bleeding.  She is currently at [facility] and they would like to switch her over to [facility]. They did say that they intend for her to be in the hospital for 4 days but prefer her to go to the [facility]

## 2021-05-10 NOTE — TELEPHONE ENCOUNTER
Mailbox is full. Attempted to call  but busy signal.    [facility] L&D, Pt has placenta previa, started bleeding called 911 but currently not bleeding now.  Pt wants to be transferred to [place] but needs Dr. Amy Baltazar to speak with D

## 2021-05-10 NOTE — PROGRESS NOTES
Telephone call:    Patient paged at 10:17 PM stating having significant bleeding and going to [address] [place] [address] patient back initially did not answer. Called again. Patient spouse Lori Cardenas answered.   Patient spouse stated that they dialed 911 and

## 2021-05-11 PROCEDURE — 13003286 HB ROOM CHARGE L&D

## 2021-05-11 PROCEDURE — 76817 TRANSVAGINAL US OBSTETRIC: CPT | Performed by: OBSTETRICS & GYNECOLOGY

## 2021-05-11 PROCEDURE — 10002803 HB RX 637: Performed by: OBSTETRICS & GYNECOLOGY

## 2021-05-11 PROCEDURE — 99233 SBSQ HOSP IP/OBS HIGH 50: CPT | Performed by: OBSTETRICS & GYNECOLOGY

## 2021-05-11 RX ADMIN — DOCUSATE SODIUM 100 MG: 100 CAPSULE, LIQUID FILLED ORAL at 15:45

## 2021-05-11 ASSESSMENT — PAIN SCALES - GENERAL
PAINLEVEL_OUTOF10: 0
PAINLEVEL_OUTOF10: 0

## 2021-05-11 ASSESSMENT — PAIN SCALES - WONG BAKER: WONGBAKER_NUMERICALRESPONSE: 0

## 2021-05-12 ENCOUNTER — HOSPITAL ENCOUNTER (OUTPATIENT)
Facility: HOSPITAL | Age: 31
Setting detail: OBSERVATION
Discharge: HOME | End: 2021-05-13
Attending: OBSTETRICS & GYNECOLOGY | Admitting: OBSTETRICS & GYNECOLOGY
Payer: COMMERCIAL

## 2021-05-12 VITALS
RESPIRATION RATE: 18 BRPM | DIASTOLIC BLOOD PRESSURE: 80 MMHG | WEIGHT: 198 LBS | HEART RATE: 101 BPM | SYSTOLIC BLOOD PRESSURE: 130 MMHG | OXYGEN SATURATION: 100 % | BODY MASS INDEX: 31.08 KG/M2 | TEMPERATURE: 98.2 F | HEIGHT: 67 IN

## 2021-05-12 PROBLEM — O26.93 PREGNANCY RELATED CONDITION IN THIRD TRIMESTER (HCC): Status: ACTIVE | Noted: 2021-05-12

## 2021-05-12 PROBLEM — O26.93 PREGNANCY RELATED CONDITION IN THIRD TRIMESTER: Status: ACTIVE | Noted: 2021-05-12

## 2021-05-12 PROCEDURE — 10002803 HB RX 637: Performed by: OBSTETRICS & GYNECOLOGY

## 2021-05-12 RX ADMIN — DOCUSATE SODIUM 100 MG: 100 CAPSULE, LIQUID FILLED ORAL at 08:31

## 2021-05-12 ASSESSMENT — PAIN SCALES - WONG BAKER: WONGBAKER_NUMERICALRESPONSE: 0

## 2021-05-12 ASSESSMENT — PAIN SCALES - GENERAL: PAINLEVEL_OUTOF10: 0

## 2021-05-13 ENCOUNTER — TELEPHONE (OUTPATIENT)
Dept: OBGYN CLINIC | Facility: CLINIC | Age: 31
End: 2021-05-13

## 2021-05-13 ENCOUNTER — ROUTINE PRENATAL (OUTPATIENT)
Dept: OBGYN CLINIC | Facility: CLINIC | Age: 31
End: 2021-05-13
Payer: COMMERCIAL

## 2021-05-13 VITALS
BODY MASS INDEX: 31.8 KG/M2 | HEIGHT: 67 IN | WEIGHT: 202.63 LBS | SYSTOLIC BLOOD PRESSURE: 116 MMHG | DIASTOLIC BLOOD PRESSURE: 70 MMHG

## 2021-05-13 DIAGNOSIS — Z36.9 UNSPECIFIED ANTENATAL SCREENING: Primary | ICD-10-CM

## 2021-05-13 DIAGNOSIS — O44.00 PLACENTA PREVIA WITHOUT HEMORRHAGE, ANTEPARTUM: ICD-10-CM

## 2021-05-13 PROBLEM — Z87.59 HISTORY OF POSTPARTUM HEMORRHAGE: Chronic | Status: ACTIVE | Noted: 2020-12-02

## 2021-05-13 PROBLEM — O09.299 HISTORY OF RETAINED PLACENTA IN PRIOR PREGNANCY, CURRENTLY PREGNANT: Chronic | Status: ACTIVE | Noted: 2021-05-13

## 2021-05-13 PROBLEM — O09.299 HISTORY OF RETAINED PLACENTA IN PRIOR PREGNANCY, CURRENTLY PREGNANT: Status: ACTIVE | Noted: 2021-05-13

## 2021-05-13 PROBLEM — O09.299 HISTORY OF RETAINED PLACENTA IN PRIOR PREGNANCY, CURRENTLY PREGNANT (HCC): Chronic | Status: ACTIVE | Noted: 2021-05-13

## 2021-05-13 PROBLEM — O99.013 ANEMIA DURING PREGNANCY IN THIRD TRIMESTER: Chronic | Status: ACTIVE | Noted: 2020-12-02

## 2021-05-13 PROBLEM — O09.299 HISTORY OF RETAINED PLACENTA IN PRIOR PREGNANCY, CURRENTLY PREGNANT (HCC): Status: ACTIVE | Noted: 2021-05-13

## 2021-05-13 PROBLEM — O99.013 ANEMIA DURING PREGNANCY IN THIRD TRIMESTER (HCC): Chronic | Status: ACTIVE | Noted: 2020-12-02

## 2021-05-13 PROCEDURE — 3074F SYST BP LT 130 MM HG: CPT | Performed by: OBSTETRICS & GYNECOLOGY

## 2021-05-13 PROCEDURE — 3008F BODY MASS INDEX DOCD: CPT | Performed by: OBSTETRICS & GYNECOLOGY

## 2021-05-13 PROCEDURE — 3078F DIAST BP <80 MM HG: CPT | Performed by: OBSTETRICS & GYNECOLOGY

## 2021-05-13 PROCEDURE — 81002 URINALYSIS NONAUTO W/O SCOPE: CPT | Performed by: OBSTETRICS & GYNECOLOGY

## 2021-05-13 PROCEDURE — 99213 OFFICE O/P EST LOW 20 MIN: CPT

## 2021-05-13 PROCEDURE — 59025 FETAL NON-STRESS TEST: CPT

## 2021-05-13 RX ORDER — BREAST PUMP
EACH MISCELLANEOUS
Qty: 1 EACH | Refills: 0 | Status: SHIPPED | OUTPATIENT
Start: 2021-05-13

## 2021-05-13 NOTE — PROGRESS NOTES
26 y/o  with known placenta previa at 29 5/7 W. She had first episode of bleeding 4 days ago with large hemorrhage - over a cup, bled thru bedding and onto carpet -  and was admitted to [facility]. She did get betamethasone x 2 at that visit.  She also pa

## 2021-05-13 NOTE — TELEPHONE ENCOUNTER
Pt is scheduled to see [facility] today.     ----- Message from Kathie Torres MD sent at 5/12/2021 11:24 PM CDT -----  Regarding: FYI this patient was discharged  FYI this patient with a bleeding placenta previa was discharged today.     Triage RNs please call this

## 2021-05-13 NOTE — TRIAGE
[facility] - [facility]      Triage Note    Verline Cleaves Patient Status:  Observation    1990 MRN [id number]   Location [address] Attending Deneen Baltazar MD   [address] Day # 0 PCP Vivi Nolan Para: bleeding. Pt reports good baby movements. dr Demetrius Tapia called with pt status. Discharge order received. Pt is to be seen in the MD office in am. Discharge  Instructions,  and kick counts Iinfo given to pt . She verbalizes understanding.       Jalen Samano

## 2021-05-13 NOTE — PROGRESS NOTES
Pt is a 27year old female admitted to TR4/[facility]4-A. Patient presents with:  Vaginal Bleeding: received with c/o brownish discharge this evening, pt has history of placenta previa. Pt is [id number] 34w4d intra-uterine pregnancy.   History obtained, consents

## 2021-05-13 NOTE — TELEPHONE ENCOUNTER
----- Message from Fozia Turner MD sent at 5/12/2021 11:24 PM CDT -----  Regarding: FYI this patient was discharged  FYI this patient with a bleeding placenta previa was discharged today.     Triage RNs please call this patient and schedule close follow-up

## 2021-05-15 NOTE — PROGRESS NOTES
[facility] Consultation    Dear Dr. Ju Alan you for requesting ultrasound evaluation and maternal fetal medicine consultation on your patient Inderjit Richardson.   As you are aware she is a 27year old female with a [place] alive. She indicated that her maternal grandmother is . She indicated that her paternal grandmother is alive.       Medications:   Current Outpatient Medications:   •  Fluticasone Propionate  MCG/ACT Inhalation Aerosol, 1 puff., Disp: , Rfl: Posterior previa confirmed. Normal interface between the uterine wall and bladder. The scan was suboptimal secondary to late gestational age. The fetal measurements are consistent with the established EDC.  No ultrasound evidence of structural abnor delivery in the 36th ([id number]+days) due to symptomatic placenta previa and concern for focal accreta in the succenturiate lobe. There does not appear to be accreta near the maternal bladder.     Thank you for allowing me to participate in the care of your

## 2021-05-19 ENCOUNTER — PATIENT MESSAGE (OUTPATIENT)
Dept: PERINATAL CARE | Facility: HOSPITAL | Age: 31
End: 2021-05-19

## 2021-05-19 ENCOUNTER — TELEPHONE (OUTPATIENT)
Dept: OBGYN CLINIC | Facility: CLINIC | Age: 31
End: 2021-05-19

## 2021-05-19 ENCOUNTER — HOSPITAL ENCOUNTER (OUTPATIENT)
Facility: HOSPITAL | Age: 31
Setting detail: OBSERVATION
Discharge: HOME | End: 2021-05-19
Attending: OBSTETRICS & GYNECOLOGY | Admitting: OBSTETRICS & GYNECOLOGY
Payer: COMMERCIAL

## 2021-05-19 VITALS — HEART RATE: 98 BPM | DIASTOLIC BLOOD PRESSURE: 72 MMHG | SYSTOLIC BLOOD PRESSURE: 116 MMHG

## 2021-05-19 PROCEDURE — 99214 OFFICE O/P EST MOD 30 MIN: CPT

## 2021-05-19 PROCEDURE — 84112 EVAL AMNIOTIC FLUID PROTEIN: CPT | Performed by: OBSTETRICS & GYNECOLOGY

## 2021-05-19 PROCEDURE — 81001 URINALYSIS AUTO W/SCOPE: CPT | Performed by: OBSTETRICS & GYNECOLOGY

## 2021-05-19 PROCEDURE — 59025 FETAL NON-STRESS TEST: CPT

## 2021-05-19 NOTE — TRIAGE
[facility]      Triage Note    Inderjit Richardson Patient Status:  Observation    1990 MRN [id number]   Location [address] Attending [facility] Day # 0 PCP [place] RAMOS ROM + negative. Pt denies consistent cxs or cramping. Feeling baby movements while in triage. Tracing reactive. Pt has appointment to see ALCON macias. Discussed OTC stool softeners to help with constipation.  Discharged home ambulatory with verbal and writte

## 2021-05-19 NOTE — TELEPHONE ENCOUNTER
Pt states she has had brown discharge x 1 week. Pt states \"it is more slimy and bloody discharge\". Pt denies: bright red bleeding, contractions. Panty is soaked, pt unsure if it smells like urine. Decreased FM.  Advised pt to go to L&D triage, have someon

## 2021-05-20 ENCOUNTER — HOSPITAL ENCOUNTER (OUTPATIENT)
Dept: PERINATAL CARE | Facility: HOSPITAL | Age: 31
Discharge: HOME | End: 2021-05-20
Attending: OBSTETRICS & GYNECOLOGY
Payer: COMMERCIAL

## 2021-05-20 VITALS
BODY MASS INDEX: 32 KG/M2 | WEIGHT: 202 LBS | DIASTOLIC BLOOD PRESSURE: 78 MMHG | SYSTOLIC BLOOD PRESSURE: 128 MMHG | HEART RATE: 110 BPM

## 2021-05-20 DIAGNOSIS — O44.00 PLACENTA PREVIA WITHOUT HEMORRHAGE, ANTEPARTUM: Primary | ICD-10-CM

## 2021-05-20 DIAGNOSIS — Z87.59 HISTORY OF POSTPARTUM HEMORRHAGE: Chronic | ICD-10-CM

## 2021-05-20 DIAGNOSIS — O99.013 ANEMIA DURING PREGNANCY IN THIRD TRIMESTER: Chronic | ICD-10-CM

## 2021-05-20 DIAGNOSIS — O44.00 PLACENTA PREVIA WITHOUT HEMORRHAGE, ANTEPARTUM: ICD-10-CM

## 2021-05-20 DIAGNOSIS — O44.13 ANTEPARTUM HEMORRHAGE FROM PLACENTA PREVIA IN THIRD TRIMESTER: ICD-10-CM

## 2021-05-20 PROCEDURE — 76811 OB US DETAILED SNGL FETUS: CPT | Performed by: OBSTETRICS & GYNECOLOGY

## 2021-05-20 PROCEDURE — 99243 OFF/OP CNSLTJ NEW/EST LOW 30: CPT | Performed by: OBSTETRICS & GYNECOLOGY

## 2021-05-20 PROCEDURE — 76819 FETAL BIOPHYS PROFIL W/O NST: CPT | Performed by: OBSTETRICS & GYNECOLOGY

## 2021-05-20 PROCEDURE — 76817 TRANSVAGINAL US OBSTETRIC: CPT | Performed by: OBSTETRICS & GYNECOLOGY

## 2021-05-21 ENCOUNTER — ROUTINE PRENATAL (OUTPATIENT)
Dept: OBGYN CLINIC | Facility: CLINIC | Age: 31
End: 2021-05-21
Payer: COMMERCIAL

## 2021-05-21 ENCOUNTER — HOSPITAL ENCOUNTER (OUTPATIENT)
Dept: MRI IMAGING | Facility: HOSPITAL | Age: 31
Discharge: HOME | End: 2021-05-21
Attending: OBSTETRICS & GYNECOLOGY
Payer: COMMERCIAL

## 2021-05-21 VITALS
HEIGHT: 67 IN | BODY MASS INDEX: 30.92 KG/M2 | WEIGHT: 197 LBS | DIASTOLIC BLOOD PRESSURE: 72 MMHG | SYSTOLIC BLOOD PRESSURE: 118 MMHG

## 2021-05-21 DIAGNOSIS — O09.299 HISTORY OF RETAINED PLACENTA IN PRIOR PREGNANCY, CURRENTLY PREGNANT: Chronic | ICD-10-CM

## 2021-05-21 DIAGNOSIS — O44.00 PLACENTA PREVIA WITHOUT HEMORRHAGE, ANTEPARTUM: ICD-10-CM

## 2021-05-21 DIAGNOSIS — O09.299 HISTORY OF RETAINED PLACENTA IN PRIOR PREGNANCY, CURRENTLY PREGNANT: ICD-10-CM

## 2021-05-21 DIAGNOSIS — O44.00 PLACENTA PREVIA WITHOUT HEMORRHAGE, ANTEPARTUM: Primary | Chronic | ICD-10-CM

## 2021-05-21 DIAGNOSIS — Z36.9 UNSPECIFIED ANTENATAL SCREENING: ICD-10-CM

## 2021-05-21 PROCEDURE — 3078F DIAST BP <80 MM HG: CPT | Performed by: OBSTETRICS & GYNECOLOGY

## 2021-05-21 PROCEDURE — 74181 MRI ABDOMEN W/O CONTRAST: CPT | Performed by: OBSTETRICS & GYNECOLOGY

## 2021-05-21 PROCEDURE — 3074F SYST BP LT 130 MM HG: CPT | Performed by: OBSTETRICS & GYNECOLOGY

## 2021-05-21 PROCEDURE — 72195 MRI PELVIS W/O DYE: CPT | Performed by: OBSTETRICS & GYNECOLOGY

## 2021-05-21 PROCEDURE — 3008F BODY MASS INDEX DOCD: CPT | Performed by: OBSTETRICS & GYNECOLOGY

## 2021-05-21 NOTE — PROGRESS NOTES
Here after USN this morning with MFM Dr. Laura Martinez showing focal area of anterior succenturiate lobe suspicious for possible placenta accreta. Has MRI appointment immediately after this visit with results to be paged to Dr. Nirali Bonner on call.  Has had no bleedi

## 2021-05-22 NOTE — TELEPHONE ENCOUNTER
MRI today:  Smaller separate right anterior inferior placenta is free of the internal os. Addendum:  Findings suspicious for placenta accreta a involving the smaller right   anterior inferior accessory lobe of placenta. I dw pt findings.  She has been r

## 2021-06-02 ENCOUNTER — APPOINTMENT (OUTPATIENT)
Dept: HEMATOLOGY/ONCOLOGY | Facility: HOSPITAL | Age: 31
End: 2021-06-02
Attending: INTERNAL MEDICINE

## 2021-06-02 ENCOUNTER — APPOINTMENT (OUTPATIENT)
Dept: HEMATOLOGY/ONCOLOGY | Facility: HOSPITAL | Age: 31
End: 2021-06-02
Attending: INTERNAL MEDICINE
Payer: COMMERCIAL

## 2021-06-11 ENCOUNTER — OFFICE VISIT (OUTPATIENT)
Dept: OBGYN CLINIC | Facility: CLINIC | Age: 31
End: 2021-06-11
Payer: COMMERCIAL

## 2021-06-11 DIAGNOSIS — Z09 POSTOP CHECK: Primary | ICD-10-CM

## 2021-06-22 NOTE — PROGRESS NOTES
Patient s/p c/s at [facility] presented with c/o feeling like her wound had slightly separted. No fever, chills, nausea or vomiting. Feels well. Wound CDI. No separation. FU in 2 W. Reassured pt.

## 2021-07-15 ENCOUNTER — HOSPITAL ENCOUNTER (OUTPATIENT)
Age: 31
Discharge: HOME | End: 2021-07-15
Payer: COMMERCIAL

## 2021-07-15 VITALS
TEMPERATURE: 97 F | RESPIRATION RATE: 20 BRPM | HEART RATE: 92 BPM | OXYGEN SATURATION: 98 % | SYSTOLIC BLOOD PRESSURE: 110 MMHG | DIASTOLIC BLOOD PRESSURE: 65 MMHG

## 2021-07-15 DIAGNOSIS — Z20.822 ENCOUNTER FOR LABORATORY TESTING FOR COVID-19 VIRUS: Primary | ICD-10-CM

## 2021-07-15 DIAGNOSIS — Z87.09 HISTORY OF STREP SORE THROAT: ICD-10-CM

## 2021-07-15 DIAGNOSIS — R05.9 COUGH: ICD-10-CM

## 2021-07-15 DIAGNOSIS — U07.1 LAB TEST POSITIVE FOR DETECTION OF COVID-19 VIRUS: ICD-10-CM

## 2021-07-15 LAB — SARS-COV-2 RNA RESP QL NAA+PROBE: DETECTED

## 2021-07-15 PROCEDURE — 99213 OFFICE O/P EST LOW 20 MIN: CPT

## 2021-07-15 RX ORDER — AMOXICILLIN 500 MG/1
500 TABLET, FILM COATED ORAL 2 TIMES DAILY
COMMUNITY
Start: 2021-07-10 | End: 2021-10-11

## 2021-07-16 NOTE — ED PROVIDER NOTES
Patient Seen in: [facility]      History   Patient presents with:  Cough/URI    Stated Complaint: Congestion    HPI/Subjective:   HPI  Delmy Meraz is a 27year old female who is currently breast-feeding her 3month-old infant here for [employer] Physical Exam  Vitals and nursing note reviewed. Constitutional:       Appearance: Normal appearance. She is not ill-appearing. HENT:      Head: Normocephalic.       Right Ear: Tympanic membrane, ear canal and external ear normal.      Left Ear: Patients, Parents and Caregivers”, informed of alternatives to receiving authorized Casirivimab and Aileen Dhruv, and informed that [address] and Aileen Dhruv are unapproved drugs that are authorized for use under this Emergency Use Authorization The patient h 8:49 pm    Follow-up:  [address] [facility] [id number] [phone]                Medications Prescribed:  Discharge Medication List as of 7/15/2021  8:56 PM

## 2021-07-16 NOTE — ED INITIAL ASSESSMENT (HPI)
On day 5 of antibiotics for + strep and otitis. States sore throat resolved but continues to have headache and congestion. No fever. Requests covid testing.

## 2021-07-19 ENCOUNTER — TELEPHONE (OUTPATIENT)
Dept: CASE MANAGEMENT | Age: 31
End: 2021-07-19

## 2021-07-19 NOTE — TELEPHONE ENCOUNTER
Spoke to pt's  on 7/16 who stated pt was going to check with her OB to see if she should get the PAB infusion    Spoke to pt who declines getting PAB infusion    Order cancelled

## 2021-08-04 ENCOUNTER — POSTPARTUM (OUTPATIENT)
Dept: OBGYN CLINIC | Facility: CLINIC | Age: 31
End: 2021-08-04
Payer: COMMERCIAL

## 2021-08-04 VITALS
BODY MASS INDEX: 28.94 KG/M2 | WEIGHT: 184.38 LBS | HEIGHT: 67 IN | SYSTOLIC BLOOD PRESSURE: 122 MMHG | DIASTOLIC BLOOD PRESSURE: 70 MMHG

## 2021-08-04 DIAGNOSIS — Z30.2 STATUS POST TUBAL LIGATION AT TIME OF DELIVERY, CURRENT HOSP: ICD-10-CM

## 2021-08-04 DIAGNOSIS — Z98.891 STATUS POST PRIMARY LOW TRANSVERSE CESAREAN SECTION: ICD-10-CM

## 2021-08-04 PROBLEM — Z34.90 PREGNANCY (HCC): Status: RESOLVED | Noted: 2021-02-02 | Resolved: 2021-08-04

## 2021-08-04 PROBLEM — Z32.01 PREGNANCY EXAMINATION OR TEST, POSITIVE RESULT (HCC): Status: RESOLVED | Noted: 2020-10-29 | Resolved: 2021-08-04

## 2021-08-04 PROBLEM — Z34.83 MULTIGRAVIDA IN THIRD TRIMESTER: Status: RESOLVED | Noted: 2020-12-02 | Resolved: 2021-08-04

## 2021-08-04 PROBLEM — O44.00 PLACENTA PREVIA WITHOUT HEMORRHAGE, ANTEPARTUM: Chronic | Status: RESOLVED | Noted: 2021-02-03 | Resolved: 2021-08-04

## 2021-08-04 PROBLEM — Z86.16 PERSONAL HISTORY OF COVID-19: Status: ACTIVE | Noted: 2021-08-04

## 2021-08-04 PROBLEM — Z87.59 HISTORY OF PLACENTA PREVIA: Status: ACTIVE | Noted: 2021-08-04

## 2021-08-04 PROBLEM — O26.93 PREGNANCY RELATED CONDITION IN THIRD TRIMESTER: Status: RESOLVED | Noted: 2021-05-12 | Resolved: 2021-08-04

## 2021-08-04 PROBLEM — O09.299 HISTORY OF RETAINED PLACENTA IN PRIOR PREGNANCY, CURRENTLY PREGNANT: Chronic | Status: RESOLVED | Noted: 2021-05-13 | Resolved: 2021-08-04

## 2021-08-04 PROBLEM — Z34.90 PREGNANCY: Status: RESOLVED | Noted: 2021-02-02 | Resolved: 2021-08-04

## 2021-08-04 PROBLEM — O44.00 PLACENTA PREVIA WITHOUT HEMORRHAGE, ANTEPARTUM (HCC): Chronic | Status: RESOLVED | Noted: 2021-02-03 | Resolved: 2021-08-04

## 2021-08-04 PROBLEM — O09.299 HISTORY OF RETAINED PLACENTA IN PRIOR PREGNANCY, CURRENTLY PREGNANT (HCC): Chronic | Status: RESOLVED | Noted: 2021-05-13 | Resolved: 2021-08-04

## 2021-08-04 PROBLEM — Z32.01 PREGNANCY EXAMINATION OR TEST, POSITIVE RESULT: Status: RESOLVED | Noted: 2020-10-29 | Resolved: 2021-08-04

## 2021-08-04 PROBLEM — Z34.83 MULTIGRAVIDA IN THIRD TRIMESTER (HCC): Status: RESOLVED | Noted: 2020-12-02 | Resolved: 2021-08-04

## 2021-08-04 PROBLEM — O26.93 PREGNANCY RELATED CONDITION IN THIRD TRIMESTER (HCC): Status: RESOLVED | Noted: 2021-05-12 | Resolved: 2021-08-04

## 2021-08-04 PROCEDURE — 3078F DIAST BP <80 MM HG: CPT | Performed by: OBSTETRICS & GYNECOLOGY

## 2021-08-04 PROCEDURE — 3008F BODY MASS INDEX DOCD: CPT | Performed by: OBSTETRICS & GYNECOLOGY

## 2021-08-04 PROCEDURE — 3074F SYST BP LT 130 MM HG: CPT | Performed by: OBSTETRICS & GYNECOLOGY

## 2021-08-04 RX ORDER — FLUCONAZOLE 150 MG/1
150 TABLET ORAL DAILY
Qty: 2 TABLET | Refills: 0 | Status: SHIPPED | OUTPATIENT
Start: 2021-08-04 | End: 2021-08-06
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
## 2021-09-04 ENCOUNTER — HOSPITAL ENCOUNTER (OUTPATIENT)
Age: 31
Discharge: HOME | End: 2021-09-04
Payer: COMMERCIAL

## 2021-09-04 VITALS
OXYGEN SATURATION: 100 % | HEART RATE: 102 BPM | TEMPERATURE: 97 F | RESPIRATION RATE: 16 BRPM | DIASTOLIC BLOOD PRESSURE: 70 MMHG | SYSTOLIC BLOOD PRESSURE: 125 MMHG

## 2021-09-04 DIAGNOSIS — R49.0 VOICE HOARSENESS: Primary | ICD-10-CM

## 2021-09-04 PROCEDURE — 99202 OFFICE O/P NEW SF 15 MIN: CPT | Performed by: PHYSICIAN ASSISTANT

## 2021-09-04 NOTE — ED INITIAL ASSESSMENT (HPI)
Pt states that she has a hoarse voice, denies sore throat. Pt received her 2nd covid vaccine 2 days ago and has been having a runny nose.

## 2021-09-04 NOTE — ED PROVIDER NOTES
Patient Seen in: [facility]      History   Patient presents with:  Throat Problem    Stated Complaint: sore throat    HPI/Subjective:   HPI    31 yo female here for evaluation of voice hoarseness. Pt reports symptoms x 3 days.   She states s reviewed and negative except as noted above.     Physical Exam     ED Triage Vitals [09/04/21 1348]   /70   Pulse 102   Resp 16   Temp 97.4 °F (36.3 °C)   Temp src Temporal   SpO2 100 %   O2 Device None (Room air)       Current:/70   Pulse 102 Paramjit David  [phone]                Medications Prescribed:  Discharge Medication List as of 9/4/2021  2:42 PM

## 2021-09-29 ENCOUNTER — HOSPITAL ENCOUNTER (OUTPATIENT)
Age: 31
Discharge: HOME | End: 2021-09-29
Payer: COMMERCIAL

## 2021-09-29 VITALS
WEIGHT: 182 LBS | HEIGHT: 66 IN | SYSTOLIC BLOOD PRESSURE: 126 MMHG | RESPIRATION RATE: 20 BRPM | DIASTOLIC BLOOD PRESSURE: 68 MMHG | HEART RATE: 83 BPM | BODY MASS INDEX: 29.25 KG/M2 | TEMPERATURE: 98 F | OXYGEN SATURATION: 100 %

## 2021-09-29 DIAGNOSIS — J45.901 MILD ASTHMA WITH EXACERBATION, UNSPECIFIED WHETHER PERSISTENT: Primary | ICD-10-CM

## 2021-09-29 DIAGNOSIS — J06.9 VIRAL UPPER RESPIRATORY TRACT INFECTION: ICD-10-CM

## 2021-09-29 PROCEDURE — 99213 OFFICE O/P EST LOW 20 MIN: CPT | Performed by: NURSE PRACTITIONER

## 2021-09-29 RX ORDER — PREDNISONE 20 MG/1
60 TABLET ORAL ONCE
Status: COMPLETED | OUTPATIENT
Start: 2021-09-29 | End: 2021-09-29

## 2021-09-29 RX ORDER — AMOXICILLIN 875 MG/1
875 TABLET, COATED ORAL 2 TIMES DAILY
Qty: 20 TABLET | Refills: 0 | Status: SHIPPED | OUTPATIENT
Start: 2021-09-29 | End: 2021-10-09

## 2021-09-29 RX ORDER — PREDNISONE 20 MG/1
40 TABLET ORAL DAILY
Qty: 10 TABLET | Refills: 0 | Status: SHIPPED | OUTPATIENT
Start: 2021-09-29 | End: 2021-10-04

## 2021-09-29 NOTE — ED INITIAL ASSESSMENT (HPI)
Patient is here with a productive cough with a yellow, greenish phlegm production for the last week.

## 2021-09-29 NOTE — ED PROVIDER NOTES
Patient Seen in: [facility]      History   Patient presents with:  Cough    Stated Complaint: cough    Subjective:   HPI    75-year-old female with history of asthma, here today with complaints of cough, phlegm, feeling like she is wheezing /68   Pulse 83   Resp 20   Temp 97.5 °F (36.4 °C)   Temp src Oral   SpO2 100 %   O2 Device None (Room air)       Current:/68   Pulse 83   Temp 97.5 °F (36.4 °C) (Oral)   Resp 20   Ht 167.6 cm (5' 6\")   Wt 82.6 kg   LMP 09/12/2020 (Exact Date covid test. Nontoxic, does not meet SIRS criteria. Patient does not have uvula deviation or unilateral tonsillar swelling to indicate tonsillar abscess. No meningsmus or trismus. No dysphagia or difficulty handing secretions.  No evidence for otitis media

## 2021-10-11 ENCOUNTER — OFFICE VISIT (OUTPATIENT)
Dept: FAMILY MEDICINE CLINIC | Facility: CLINIC | Age: 31
End: 2021-10-11
Payer: COMMERCIAL

## 2021-10-11 ENCOUNTER — LAB ENCOUNTER (OUTPATIENT)
Dept: LAB | Facility: REFERENCE LAB | Age: 31
End: 2021-10-11
Attending: FAMILY MEDICINE
Payer: COMMERCIAL

## 2021-10-11 VITALS
DIASTOLIC BLOOD PRESSURE: 72 MMHG | HEART RATE: 78 BPM | OXYGEN SATURATION: 98 % | WEIGHT: 181 LBS | HEIGHT: 67 IN | SYSTOLIC BLOOD PRESSURE: 126 MMHG | BODY MASS INDEX: 28.41 KG/M2

## 2021-10-11 DIAGNOSIS — Z13.1 DIABETES MELLITUS SCREENING: ICD-10-CM

## 2021-10-11 DIAGNOSIS — D50.9 IRON DEFICIENCY ANEMIA, UNSPECIFIED IRON DEFICIENCY ANEMIA TYPE: ICD-10-CM

## 2021-10-11 DIAGNOSIS — Z13.220 SCREENING FOR HYPERLIPIDEMIA: ICD-10-CM

## 2021-10-11 DIAGNOSIS — E04.1 THYROID NODULE: ICD-10-CM

## 2021-10-11 DIAGNOSIS — J30.89 NON-SEASONAL ALLERGIC RHINITIS, UNSPECIFIED TRIGGER: ICD-10-CM

## 2021-10-11 DIAGNOSIS — Z00.00 PHYSICAL EXAM, ANNUAL: Primary | ICD-10-CM

## 2021-10-11 DIAGNOSIS — J45.30 MILD PERSISTENT ASTHMA WITHOUT COMPLICATION: ICD-10-CM

## 2021-10-11 PROCEDURE — 3008F BODY MASS INDEX DOCD: CPT | Performed by: FAMILY MEDICINE

## 2021-10-11 PROCEDURE — 80050 GENERAL HEALTH PANEL: CPT | Performed by: FAMILY MEDICINE

## 2021-10-11 PROCEDURE — 84466 ASSAY OF TRANSFERRIN: CPT

## 2021-10-11 PROCEDURE — 90686 IIV4 VACC NO PRSV 0.5 ML IM: CPT | Performed by: FAMILY MEDICINE

## 2021-10-11 PROCEDURE — 3078F DIAST BP <80 MM HG: CPT | Performed by: FAMILY MEDICINE

## 2021-10-11 PROCEDURE — 99385 PREV VISIT NEW AGE 18-39: CPT | Performed by: FAMILY MEDICINE

## 2021-10-11 PROCEDURE — 3074F SYST BP LT 130 MM HG: CPT | Performed by: FAMILY MEDICINE

## 2021-10-11 PROCEDURE — 90471 IMMUNIZATION ADMIN: CPT | Performed by: FAMILY MEDICINE

## 2021-10-11 PROCEDURE — 82728 ASSAY OF FERRITIN: CPT

## 2021-10-11 PROCEDURE — 83036 HEMOGLOBIN GLYCOSYLATED A1C: CPT | Performed by: FAMILY MEDICINE

## 2021-10-11 PROCEDURE — 99203 OFFICE O/P NEW LOW 30 MIN: CPT | Performed by: FAMILY MEDICINE

## 2021-10-11 PROCEDURE — 80061 LIPID PANEL: CPT | Performed by: FAMILY MEDICINE

## 2021-10-11 PROCEDURE — 83540 ASSAY OF IRON: CPT

## 2021-10-11 PROCEDURE — 36415 COLL VENOUS BLD VENIPUNCTURE: CPT

## 2021-10-11 NOTE — PROGRESS NOTES
HPI:   Nito Bermudez is a 32year old female who presents for a complete physical exam.     Hx of asthma. Takes flovent but not consistently. On the average week, does not use proair. Used to take singulair. Has hx of AR.  Tried claritin in the past. Sodium 10 MG Oral Tab Take 10 mg by mouth nightly.  (Patient not taking: Reported on 10/11/2021)       No Known Allergies   Past Medical History:   Diagnosis Date   • Abnormal Pap smear of cervix 2008   • Acne    • Allergies    • Anemia    • Anemia during p kg/m²     GENERAL: well developed, well nourished, in no apparent distress   SKIN: no rashes, no suspicious lesions  HEENT: atraumatic, normocephalic, throat clear; normal dentition  EYES: PERRLA, EOMI, conjunctiva are clear  NECK: supple, no adenopathy or following:  -Monthly breast self-exam  -Breast cancer screening/mammograms and clinical breast exams  -Cervical cancer screening/pap smears  -Colon cancer screening/colonoscopy  -Healthy diet including adequate intake of vegetables and fruits, appropriate

## 2021-10-12 ENCOUNTER — TELEPHONE (OUTPATIENT)
Dept: FAMILY MEDICINE CLINIC | Facility: CLINIC | Age: 31
End: 2021-10-12

## 2021-10-12 PROBLEM — J45.30 MILD PERSISTENT ASTHMA WITHOUT COMPLICATION: Status: ACTIVE | Noted: 2021-10-12

## 2021-10-12 PROBLEM — J30.89 NON-SEASONAL ALLERGIC RHINITIS: Status: ACTIVE | Noted: 2021-10-12

## 2021-10-12 PROBLEM — E04.1 THYROID NODULE: Status: ACTIVE | Noted: 2021-10-12

## 2021-10-12 PROBLEM — Z51.81 ENCOUNTER FOR MEDICATION MONITORING: Status: RESOLVED | Noted: 2021-04-09 | Resolved: 2021-10-12

## 2021-10-12 PROBLEM — J45.30 MILD PERSISTENT ASTHMA WITHOUT COMPLICATION (HCC): Status: ACTIVE | Noted: 2021-10-12

## 2021-10-12 NOTE — TELEPHONE ENCOUNTER
RN contacted pt and informed that the Epic system releases all results before the provider has had an opportunity to review and discuss with you. RN advised pt to give MP. an opportunity to review the results. Pt verbalizd understanding.  RN to route to MP

## 2021-12-06 ENCOUNTER — HOSPITAL ENCOUNTER (OUTPATIENT)
Age: 31
Discharge: HOME | End: 2021-12-06
Payer: COMMERCIAL

## 2021-12-06 VITALS
DIASTOLIC BLOOD PRESSURE: 81 MMHG | TEMPERATURE: 98 F | SYSTOLIC BLOOD PRESSURE: 139 MMHG | RESPIRATION RATE: 20 BRPM | HEART RATE: 74 BPM | OXYGEN SATURATION: 99 %

## 2021-12-06 DIAGNOSIS — J06.9 VIRAL URI: Primary | ICD-10-CM

## 2021-12-06 PROCEDURE — U0002 COVID-19 LAB TEST NON-CDC: HCPCS | Performed by: NURSE PRACTITIONER

## 2021-12-06 PROCEDURE — 99213 OFFICE O/P EST LOW 20 MIN: CPT | Performed by: NURSE PRACTITIONER

## 2021-12-06 PROCEDURE — 87880 STREP A ASSAY W/OPTIC: CPT | Performed by: NURSE PRACTITIONER

## 2021-12-06 NOTE — ED PROVIDER NOTES
Patient Seen in: [facility]      History   Patient presents with:  Testing  Sore Throat  Diarrhea  Abdominal Pain    Stated Complaint: TESTING; SORE [place] PAIN    Subjective:   HPI    This is a well-appe Use      Vaping Use: Never used    Alcohol use: Never    Drug use: Never             Review of Systems   HENT: Positive for congestion, rhinorrhea and sore throat. Gastrointestinal: Positive for diarrhea. All other systems reviewed and are negative. Normal heart sounds. Pulmonary:      Effort: Pulmonary effort is normal.      Breath sounds: Normal breath sounds and air entry. No stridor, decreased air movement or transmitted upper airway sounds.    Abdominal:      General: Bowel sounds are normal. Disposition:  Discharge  12/6/2021  1:04 pm    Follow-up:  Georges Booker DO  [address] [place] [address]  [phone]                Medications Prescribed:  Current Discharge Medication List

## 2021-12-13 ENCOUNTER — HOSPITAL ENCOUNTER (OUTPATIENT)
Dept: ULTRASOUND IMAGING | Facility: HOSPITAL | Age: 31
Discharge: HOME | End: 2021-12-13
Attending: FAMILY MEDICINE
Payer: COMMERCIAL

## 2021-12-13 DIAGNOSIS — E04.1 THYROID NODULE: Primary | ICD-10-CM

## 2021-12-13 DIAGNOSIS — E04.1 THYROID NODULE: ICD-10-CM

## 2021-12-13 PROCEDURE — 76536 US EXAM OF HEAD AND NECK: CPT | Performed by: FAMILY MEDICINE

## 2022-01-01 ENCOUNTER — HOSPITAL ENCOUNTER (EMERGENCY)
Facility: HOSPITAL | Age: 32
Discharge: HOME | End: 2022-01-01
Payer: COMMERCIAL

## 2022-01-01 ENCOUNTER — APPOINTMENT (OUTPATIENT)
Dept: CT IMAGING | Facility: HOSPITAL | Age: 32
End: 2022-01-01
Attending: NURSE PRACTITIONER
Payer: COMMERCIAL

## 2022-01-01 VITALS
HEART RATE: 100 BPM | WEIGHT: 180 LBS | OXYGEN SATURATION: 99 % | HEIGHT: 66 IN | DIASTOLIC BLOOD PRESSURE: 89 MMHG | BODY MASS INDEX: 28.93 KG/M2 | SYSTOLIC BLOOD PRESSURE: 130 MMHG | TEMPERATURE: 98 F | RESPIRATION RATE: 18 BRPM

## 2022-01-01 DIAGNOSIS — S09.90XA CLOSED HEAD INJURY, INITIAL ENCOUNTER: ICD-10-CM
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
## 2022-01-01 PROCEDURE — 99284 EMERGENCY DEPT VISIT MOD MDM: CPT

## 2022-01-01 PROCEDURE — 70450 CT HEAD/BRAIN W/O DYE: CPT | Performed by: NURSE PRACTITIONER
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
## 2022-07-03 ENCOUNTER — HOSPITAL ENCOUNTER (OUTPATIENT)
Age: 32
Discharge: HOME | End: 2022-07-03
Payer: COMMERCIAL

## 2022-07-03 DIAGNOSIS — Z20.822 ENCOUNTER FOR SCREENING LABORATORY TESTING FOR COVID-19 VIRUS: Primary | ICD-10-CM

## 2022-07-03 LAB — SARS-COV-2 RNA RESP QL NAA+PROBE: NOT DETECTED

## 2022-07-03 PROCEDURE — U0002 COVID-19 LAB TEST NON-CDC: HCPCS | Performed by: NURSE PRACTITIONER

## 2022-07-22 ENCOUNTER — MOBILE ENCOUNTER (OUTPATIENT)
Dept: INTEGRATIVE MEDICINE | Facility: CLINIC | Age: 32
End: 2022-07-22

## 2022-07-22 RX ORDER — ALBUTEROL SULFATE 90 UG/1
1 AEROSOL, METERED RESPIRATORY (INHALATION) EVERY 6 HOURS PRN
Qty: 1 EACH | Refills: 0 | Status: SHIPPED | OUTPATIENT
Start: 2022-07-22 | End: 2022-08-21

## 2022-07-22 NOTE — PROGRESS NOTES
Patient called to request refill of for albuterol. Recently seen in urgent care center with diagnosis of COVID. Reports cough, congestion and some mild wheezing. Notes no chest pain or shortness of breath. Refill for albuterol provided   emergency room precautions discussed and verbal understanding time.

## 2022-09-15 ENCOUNTER — NURSE TRIAGE (OUTPATIENT)
Dept: FAMILY MEDICINE CLINIC | Facility: CLINIC | Age: 32
End: 2022-09-15

## 2022-10-17 ENCOUNTER — TELEPHONE (OUTPATIENT)
Dept: FAMILY MEDICINE CLINIC | Facility: CLINIC | Age: 32
End: 2022-10-17

## 2022-10-17 NOTE — TELEPHONE ENCOUNTER
Patient (name and  verified) calling stating that she thought her PCP appt for her annual was today but she missed the appt because it was last Thursday. Assisted patient to schedule another appt for her annual thyroid check.     Future Appointments   Date Time Provider Apollo Mireles   10/31/2022  9:30 AM Sangita Norris DO [facility]   12/3/2022  9:30 AM Mone 1923

## 2022-10-31 ENCOUNTER — OFFICE VISIT (OUTPATIENT)
Dept: FAMILY MEDICINE CLINIC | Facility: CLINIC | Age: 32
End: 2022-10-31
Payer: COMMERCIAL

## 2022-10-31 ENCOUNTER — LAB ENCOUNTER (OUTPATIENT)
Dept: LAB | Age: 32
End: 2022-10-31
Attending: FAMILY MEDICINE
Payer: COMMERCIAL

## 2022-10-31 VITALS
DIASTOLIC BLOOD PRESSURE: 72 MMHG | HEIGHT: 66 IN | BODY MASS INDEX: 30.22 KG/M2 | HEART RATE: 72 BPM | SYSTOLIC BLOOD PRESSURE: 118 MMHG | OXYGEN SATURATION: 98 % | WEIGHT: 188 LBS

## 2022-10-31 DIAGNOSIS — R53.82 CHRONIC FATIGUE: Primary | ICD-10-CM

## 2022-10-31 DIAGNOSIS — R73.09 ELEVATED HEMOGLOBIN A1C: ICD-10-CM

## 2022-10-31 DIAGNOSIS — E04.1 THYROID NODULE: ICD-10-CM

## 2022-10-31 DIAGNOSIS — R53.82 CHRONIC FATIGUE: ICD-10-CM

## 2022-10-31 DIAGNOSIS — L65.9 HAIR LOSS: ICD-10-CM

## 2022-10-31 LAB
ALBUMIN SERPL-MCNC: 3.5 G/DL (ref 3.4–5)
ALBUMIN/GLOB SERPL: 0.8 {RATIO} (ref 1–2)
ALP LIVER SERPL-CCNC: 78 U/L
ALT SERPL-CCNC: 16 U/L
ANION GAP SERPL CALC-SCNC: 7 MMOL/L (ref 0–18)
AST SERPL-CCNC: 5 U/L (ref 15–37)
BASOPHILS # BLD AUTO: 0.04 X10(3) UL (ref 0–0.2)
BASOPHILS NFR BLD AUTO: 0.5 %
BILIRUB SERPL-MCNC: 0.3 MG/DL (ref 0.1–2)
BUN BLD-MCNC: 10 MG/DL (ref 7–18)
BUN/CREAT SERPL: 15.4 (ref 10–20)
CALCIUM BLD-MCNC: 9 MG/DL (ref 8.5–10.1)
CHLORIDE SERPL-SCNC: 107 MMOL/L (ref 98–112)
CO2 SERPL-SCNC: 25 MMOL/L (ref 21–32)
CREAT BLD-MCNC: 0.65 MG/DL
DEPRECATED HBV CORE AB SER IA-ACNC: 37.5 NG/ML
DEPRECATED RDW RBC AUTO: 39.8 FL (ref 35.1–46.3)
EOSINOPHIL # BLD AUTO: 0.38 X10(3) UL (ref 0–0.7)
EOSINOPHIL NFR BLD AUTO: 4.8 %
ERYTHROCYTE [DISTWIDTH] IN BLOOD BY AUTOMATED COUNT: 14.4 % (ref 11–15)
EST. AVERAGE GLUCOSE BLD GHB EST-MCNC: 131 MG/DL (ref 68–126)
FASTING STATUS PATIENT QL REPORTED: NO
GFR SERPLBLD BASED ON 1.73 SQ M-ARVRAT: 120 ML/MIN/1.73M2 (ref 60–?)
GLOBULIN PLAS-MCNC: 4.4 G/DL (ref 2.8–4.4)
GLUCOSE BLD-MCNC: 77 MG/DL (ref 70–99)
HBA1C MFR BLD: 6.2 % (ref ?–5.7)
HCT VFR BLD AUTO: 37.4 %
HGB BLD-MCNC: 11.3 G/DL
IMM GRANULOCYTES # BLD AUTO: 0.02 X10(3) UL (ref 0–1)
IMM GRANULOCYTES NFR BLD: 0.3 %
IRON SATN MFR SERPL: 7 %
IRON SERPL-MCNC: 25 UG/DL
LYMPHOCYTES # BLD AUTO: 2.18 X10(3) UL (ref 1–4)
LYMPHOCYTES NFR BLD AUTO: 27.5 %
MCH RBC QN AUTO: 23.3 PG (ref 26–34)
MCHC RBC AUTO-ENTMCNC: 30.2 G/DL (ref 31–37)
MCV RBC AUTO: 77.1 FL
MONOCYTES # BLD AUTO: 0.38 X10(3) UL (ref 0.1–1)
MONOCYTES NFR BLD AUTO: 4.8 %
NEUTROPHILS # BLD AUTO: 4.93 X10 (3) UL (ref 1.5–7.7)
NEUTROPHILS # BLD AUTO: 4.93 X10(3) UL (ref 1.5–7.7)
NEUTROPHILS NFR BLD AUTO: 62.1 %
OSMOLALITY SERPL CALC.SUM OF ELEC: 286 MOSM/KG (ref 275–295)
PLATELET # BLD AUTO: 285 10(3)UL (ref 150–450)
POTASSIUM SERPL-SCNC: 3.8 MMOL/L (ref 3.5–5.1)
PROT SERPL-MCNC: 7.9 G/DL (ref 6.4–8.2)
RBC # BLD AUTO: 4.85 X10(6)UL
SODIUM SERPL-SCNC: 139 MMOL/L (ref 136–145)
TIBC SERPL-MCNC: 365 UG/DL (ref 240–450)
TRANSFERRIN SERPL-MCNC: 245 MG/DL (ref 200–360)
TSI SER-ACNC: 0.56 MIU/ML (ref 0.36–3.74)
VIT D+METAB SERPL-MCNC: 26.3 NG/ML (ref 30–100)
WBC # BLD AUTO: 7.9 X10(3) UL (ref 4–11)

## 2022-10-31 PROCEDURE — 99214 OFFICE O/P EST MOD 30 MIN: CPT | Performed by: FAMILY MEDICINE

## 2022-10-31 PROCEDURE — 84466 ASSAY OF TRANSFERRIN: CPT

## 2022-10-31 PROCEDURE — 90686 IIV4 VACC NO PRSV 0.5 ML IM: CPT | Performed by: FAMILY MEDICINE

## 2022-10-31 PROCEDURE — 3078F DIAST BP <80 MM HG: CPT | Performed by: FAMILY MEDICINE

## 2022-10-31 PROCEDURE — 3008F BODY MASS INDEX DOCD: CPT | Performed by: FAMILY MEDICINE

## 2022-10-31 PROCEDURE — 83036 HEMOGLOBIN GLYCOSYLATED A1C: CPT | Performed by: FAMILY MEDICINE

## 2022-10-31 PROCEDURE — 36415 COLL VENOUS BLD VENIPUNCTURE: CPT | Performed by: FAMILY MEDICINE

## 2022-10-31 PROCEDURE — 83540 ASSAY OF IRON: CPT

## 2022-10-31 PROCEDURE — 80053 COMPREHEN METABOLIC PANEL: CPT | Performed by: FAMILY MEDICINE

## 2022-10-31 PROCEDURE — 85025 COMPLETE CBC W/AUTO DIFF WBC: CPT | Performed by: FAMILY MEDICINE

## 2022-10-31 PROCEDURE — 82728 ASSAY OF FERRITIN: CPT

## 2022-10-31 PROCEDURE — 84443 ASSAY THYROID STIM HORMONE: CPT | Performed by: FAMILY MEDICINE

## 2022-10-31 PROCEDURE — 90471 IMMUNIZATION ADMIN: CPT | Performed by: FAMILY MEDICINE

## 2022-10-31 PROCEDURE — 3074F SYST BP LT 130 MM HG: CPT | Performed by: FAMILY MEDICINE

## 2022-10-31 PROCEDURE — 82306 VITAMIN D 25 HYDROXY: CPT

## 2022-11-01 PROBLEM — R73.03 PREDIABETES: Status: ACTIVE | Noted: 2022-11-01

## 2022-12-03 ENCOUNTER — HOSPITAL ENCOUNTER (OUTPATIENT)
Dept: ULTRASOUND IMAGING | Age: 32
Discharge: HOME | End: 2022-12-03
Attending: FAMILY MEDICINE
Payer: COMMERCIAL

## 2022-12-03 DIAGNOSIS — E04.1 THYROID NODULE: ICD-10-CM

## 2022-12-03 PROCEDURE — 76536 US EXAM OF HEAD AND NECK: CPT | Performed by: FAMILY MEDICINE

## 2023-01-17 ENCOUNTER — NURSE TRIAGE (OUTPATIENT)
Dept: FAMILY MEDICINE CLINIC | Facility: CLINIC | Age: 33
End: 2023-01-17

## 2023-01-17 NOTE — TELEPHONE ENCOUNTER
Could add on for Friday at [facility] for in-office evaluation if that works for her.

## 2023-01-17 NOTE — TELEPHONE ENCOUNTER
I also recommend appt. Would need evaluation if migraine's are increasing in frequency or severity, and will also discuss medication options.

## 2023-01-20 ENCOUNTER — OFFICE VISIT (OUTPATIENT)
Facility: CLINIC | Age: 33
End: 2023-01-20
Payer: COMMERCIAL

## 2023-01-20 VITALS
WEIGHT: 187 LBS | DIASTOLIC BLOOD PRESSURE: 70 MMHG | HEART RATE: 73 BPM | BODY MASS INDEX: 30 KG/M2 | SYSTOLIC BLOOD PRESSURE: 110 MMHG

## 2023-01-20 DIAGNOSIS — G43.109 MIGRAINE WITH AURA AND WITHOUT STATUS MIGRAINOSUS, NOT INTRACTABLE: Primary | ICD-10-CM

## 2023-01-20 PROCEDURE — 3074F SYST BP LT 130 MM HG: CPT | Performed by: FAMILY MEDICINE

## 2023-01-20 PROCEDURE — 99213 OFFICE O/P EST LOW 20 MIN: CPT | Performed by: FAMILY MEDICINE

## 2023-01-20 PROCEDURE — 3078F DIAST BP <80 MM HG: CPT | Performed by: FAMILY MEDICINE

## 2023-01-20 RX ORDER — RIZATRIPTAN BENZOATE 5 MG/1
5 TABLET ORAL AS NEEDED
Qty: 30 TABLET | Refills: 0 | Status: SHIPPED | OUTPATIENT
Start: 2023-01-20

## 2023-01-22 ENCOUNTER — OFFICE VISIT (OUTPATIENT)
Dept: FAMILY MEDICINE CLINIC | Facility: CLINIC | Age: 33
End: 2023-01-22
Payer: COMMERCIAL

## 2023-01-22 VITALS
RESPIRATION RATE: 16 BRPM | DIASTOLIC BLOOD PRESSURE: 62 MMHG | SYSTOLIC BLOOD PRESSURE: 112 MMHG | BODY MASS INDEX: 29.89 KG/M2 | WEIGHT: 186 LBS | TEMPERATURE: 98 F | HEIGHT: 66 IN | HEART RATE: 84 BPM

## 2023-01-22 DIAGNOSIS — J02.0 STREP THROAT: Primary | ICD-10-CM

## 2023-01-22 DIAGNOSIS — Z20.818 STREP THROAT EXPOSURE: ICD-10-CM

## 2023-01-22 LAB
CONTROL LINE PRESENT WITH A CLEAR BACKGROUND (YES/NO): YES YES/NO
STREP GRP A CUL-SCR: POSITIVE

## 2023-01-22 PROCEDURE — 3074F SYST BP LT 130 MM HG: CPT

## 2023-01-22 PROCEDURE — 99213 OFFICE O/P EST LOW 20 MIN: CPT

## 2023-01-22 PROCEDURE — 87880 STREP A ASSAY W/OPTIC: CPT

## 2023-01-22 PROCEDURE — 3008F BODY MASS INDEX DOCD: CPT

## 2023-01-22 PROCEDURE — 3078F DIAST BP <80 MM HG: CPT

## 2023-01-22 RX ORDER — FLUCONAZOLE 150 MG/1
150 TABLET ORAL ONCE
Qty: 1 TABLET | Refills: 0 | Status: SHIPPED | OUTPATIENT
Start: 2023-01-22 | End: 2023-01-22

## 2023-01-22 RX ORDER — AMOXICILLIN 500 MG/1
500 CAPSULE ORAL 2 TIMES DAILY
Qty: 20 CAPSULE | Refills: 0 | Status: SHIPPED | OUTPATIENT
Start: 2023-01-22 | End: 2023-02-01

## 2023-02-09 ENCOUNTER — OFFICE VISIT (OUTPATIENT)
Dept: FAMILY MEDICINE CLINIC | Facility: CLINIC | Age: 33
End: 2023-02-09
Payer: COMMERCIAL

## 2023-02-09 ENCOUNTER — NURSE TRIAGE (OUTPATIENT)
Facility: CLINIC | Age: 33
End: 2023-02-09

## 2023-02-09 ENCOUNTER — APPOINTMENT (OUTPATIENT)
Dept: GENERAL RADIOLOGY | Facility: HOSPITAL | Age: 33
End: 2023-02-09
Attending: EMERGENCY MEDICINE
Payer: COMMERCIAL

## 2023-02-09 ENCOUNTER — APPOINTMENT (OUTPATIENT)
Dept: ULTRASOUND IMAGING | Facility: HOSPITAL | Age: 33
End: 2023-02-09
Attending: EMERGENCY MEDICINE
Payer: COMMERCIAL

## 2023-02-09 ENCOUNTER — HOSPITAL ENCOUNTER (EMERGENCY)
Facility: HOSPITAL | Age: 33
Discharge: HOME | End: 2023-02-09
Attending: EMERGENCY MEDICINE
Payer: COMMERCIAL

## 2023-02-09 VITALS
DIASTOLIC BLOOD PRESSURE: 73 MMHG | HEART RATE: 78 BPM | OXYGEN SATURATION: 100 % | WEIGHT: 189 LBS | SYSTOLIC BLOOD PRESSURE: 112 MMHG | RESPIRATION RATE: 18 BRPM | TEMPERATURE: 98 F | BODY MASS INDEX: 28.98 KG/M2 | HEIGHT: 67.72 IN

## 2023-02-09 VITALS
TEMPERATURE: 98 F | RESPIRATION RATE: 16 BRPM | SYSTOLIC BLOOD PRESSURE: 120 MMHG | DIASTOLIC BLOOD PRESSURE: 68 MMHG | BODY MASS INDEX: 31 KG/M2 | HEART RATE: 92 BPM | OXYGEN SATURATION: 98 % | WEIGHT: 189 LBS

## 2023-02-09 DIAGNOSIS — K59.00 CONSTIPATION, UNSPECIFIED CONSTIPATION TYPE: ICD-10-CM

## 2023-02-09 DIAGNOSIS — R10.13 EPIGASTRIC ABDOMINAL PAIN: ICD-10-CM

## 2023-02-09 DIAGNOSIS — R10.11 RIGHT UPPER QUADRANT ABDOMINAL PAIN: Primary | ICD-10-CM

## 2023-02-09 DIAGNOSIS — R10.13 ABDOMINAL PAIN, EPIGASTRIC: Primary | ICD-10-CM

## 2023-02-09 LAB
ALBUMIN SERPL-MCNC: 3.5 G/DL (ref 3.4–5)
ALBUMIN/GLOB SERPL: 0.8 {RATIO} (ref 1–2)
ALP LIVER SERPL-CCNC: 74 U/L
ALT SERPL-CCNC: 14 U/L
ANION GAP SERPL CALC-SCNC: 6 MMOL/L (ref 0–18)
AST SERPL-CCNC: 8 U/L (ref 15–37)
B-HCG UR QL: NEGATIVE
BASOPHILS # BLD AUTO: 0.04 X10(3) UL (ref 0–0.2)
BASOPHILS NFR BLD AUTO: 0.5 %
BILIRUB SERPL-MCNC: 0.1 MG/DL (ref 0.1–2)
BILIRUB UR QL STRIP.AUTO: NEGATIVE
BUN BLD-MCNC: 10 MG/DL (ref 7–18)
CALCIUM BLD-MCNC: 9.4 MG/DL (ref 8.5–10.1)
CHLORIDE SERPL-SCNC: 108 MMOL/L (ref 98–112)
CO2 SERPL-SCNC: 24 MMOL/L (ref 21–32)
COLOR UR AUTO: YELLOW
CREAT BLD-MCNC: 0.67 MG/DL
EOSINOPHIL # BLD AUTO: 0.27 X10(3) UL (ref 0–0.7)
EOSINOPHIL NFR BLD AUTO: 3.2 %
ERYTHROCYTE [DISTWIDTH] IN BLOOD BY AUTOMATED COUNT: 14.7 %
GFR SERPLBLD BASED ON 1.73 SQ M-ARVRAT: 119 ML/MIN/1.73M2 (ref 60–?)
GLOBULIN PLAS-MCNC: 4.5 G/DL (ref 2.8–4.4)
GLUCOSE BLD-MCNC: 104 MG/DL (ref 70–99)
GLUCOSE UR STRIP.AUTO-MCNC: NEGATIVE MG/DL
HCT VFR BLD AUTO: 36.4 %
HGB BLD-MCNC: 11.1 G/DL
IMM GRANULOCYTES # BLD AUTO: 0.02 X10(3) UL (ref 0–1)
IMM GRANULOCYTES NFR BLD: 0.2 %
KETONES UR STRIP.AUTO-MCNC: NEGATIVE MG/DL
LIPASE SERPL-CCNC: 27 U/L (ref 13–75)
LIPASE SERPL-CCNC: 94 U/L (ref 73–393)
LYMPHOCYTES # BLD AUTO: 3.02 X10(3) UL (ref 1–4)
LYMPHOCYTES NFR BLD AUTO: 35.4 %
MCH RBC QN AUTO: 22.7 PG (ref 26–34)
MCHC RBC AUTO-ENTMCNC: 30.5 G/DL (ref 31–37)
MCV RBC AUTO: 74.4 FL
MONOCYTES # BLD AUTO: 0.35 X10(3) UL (ref 0.1–1)
MONOCYTES NFR BLD AUTO: 4.1 %
NEUTROPHILS # BLD AUTO: 4.82 X10 (3) UL (ref 1.5–7.7)
NEUTROPHILS # BLD AUTO: 4.82 X10(3) UL (ref 1.5–7.7)
NEUTROPHILS NFR BLD AUTO: 56.6 %
NITRITE UR QL STRIP.AUTO: NEGATIVE
OSMOLALITY SERPL CALC.SUM OF ELEC: 285 MOSM/KG (ref 275–295)
PH UR STRIP.AUTO: 7 [PH] (ref 5–8)
PLATELET # BLD AUTO: 288 10(3)UL (ref 150–450)
POTASSIUM SERPL-SCNC: 3.7 MMOL/L (ref 3.5–5.1)
PROT SERPL-MCNC: 8 G/DL (ref 6.4–8.2)
PROT UR STRIP.AUTO-MCNC: NEGATIVE MG/DL
RBC # BLD AUTO: 4.89 X10(6)UL
RBC UR QL AUTO: NEGATIVE
SODIUM SERPL-SCNC: 138 MMOL/L (ref 136–145)
SP GR UR STRIP.AUTO: 1.02 (ref 1–1.03)
UROBILINOGEN UR STRIP.AUTO-MCNC: <2 MG/DL
WBC # BLD AUTO: 8.5 X10(3) UL (ref 4–11)

## 2023-02-09 PROCEDURE — 81025 URINE PREGNANCY TEST: CPT

## 2023-02-09 PROCEDURE — 87086 URINE CULTURE/COLONY COUNT: CPT | Performed by: EMERGENCY MEDICINE

## 2023-02-09 PROCEDURE — 81001 URINALYSIS AUTO W/SCOPE: CPT

## 2023-02-09 PROCEDURE — 99212 OFFICE O/P EST SF 10 MIN: CPT | Performed by: NURSE PRACTITIONER

## 2023-02-09 PROCEDURE — 85025 COMPLETE CBC W/AUTO DIFF WBC: CPT

## 2023-02-09 PROCEDURE — 85025 COMPLETE CBC W/AUTO DIFF WBC: CPT | Performed by: EMERGENCY MEDICINE

## 2023-02-09 PROCEDURE — 76700 US EXAM ABDOM COMPLETE: CPT | Performed by: EMERGENCY MEDICINE

## 2023-02-09 PROCEDURE — 74019 RADEX ABDOMEN 2 VIEWS: CPT | Performed by: EMERGENCY MEDICINE

## 2023-02-09 PROCEDURE — 99284 EMERGENCY DEPT VISIT MOD MDM: CPT

## 2023-02-09 PROCEDURE — 3078F DIAST BP <80 MM HG: CPT | Performed by: NURSE PRACTITIONER

## 2023-02-09 PROCEDURE — 83690 ASSAY OF LIPASE: CPT | Performed by: EMERGENCY MEDICINE

## 2023-02-09 PROCEDURE — 80053 COMPREHEN METABOLIC PANEL: CPT | Performed by: EMERGENCY MEDICINE

## 2023-02-09 PROCEDURE — 36415 COLL VENOUS BLD VENIPUNCTURE: CPT

## 2023-02-09 PROCEDURE — 80053 COMPREHEN METABOLIC PANEL: CPT

## 2023-02-09 PROCEDURE — 81001 URINALYSIS AUTO W/SCOPE: CPT | Performed by: EMERGENCY MEDICINE

## 2023-02-09 PROCEDURE — 99285 EMERGENCY DEPT VISIT HI MDM: CPT

## 2023-02-09 PROCEDURE — 3074F SYST BP LT 130 MM HG: CPT | Performed by: NURSE PRACTITIONER

## 2023-02-09 PROCEDURE — 83690 ASSAY OF LIPASE: CPT

## 2023-02-09 RX ORDER — POLYETHYLENE GLYCOL 3350 17 G/17G
17 POWDER, FOR SOLUTION ORAL DAILY
Qty: 510 G | Refills: 0 | Status: SHIPPED | OUTPATIENT
Start: 2023-02-09 | End: 2023-03-11

## 2023-02-09 RX ORDER — BIOTIN 10000 MCG
CAPSULE ORAL
COMMUNITY

## 2023-02-09 RX ORDER — DICYCLOMINE HCL 20 MG
20 TABLET ORAL 4 TIMES DAILY PRN
Qty: 30 TABLET | Refills: 0 | Status: SHIPPED | OUTPATIENT
Start: 2023-02-09 | End: 2023-03-11

## 2023-02-09 NOTE — ED INITIAL ASSESSMENT (HPI)
Pt c/o of nausea, upper abdominal pain, right upper back/shoulder pain that developed today. Pt went to immediate care and was sent to the ER for imaging.

## 2023-03-23 ENCOUNTER — HOSPITAL ENCOUNTER (OUTPATIENT)
Age: 33
Discharge: HOME | End: 2023-03-23
Payer: COMMERCIAL

## 2023-03-23 ENCOUNTER — NURSE TRIAGE (OUTPATIENT)
Facility: CLINIC | Age: 33
End: 2023-03-23

## 2023-03-23 VITALS
TEMPERATURE: 98 F | OXYGEN SATURATION: 100 % | SYSTOLIC BLOOD PRESSURE: 127 MMHG | DIASTOLIC BLOOD PRESSURE: 79 MMHG | RESPIRATION RATE: 19 BRPM | HEART RATE: 80 BPM

## 2023-03-23 DIAGNOSIS — D50.8 OTHER IRON DEFICIENCY ANEMIA: ICD-10-CM

## 2023-03-23 DIAGNOSIS — R42 EPISODIC LIGHTHEADEDNESS: Primary | ICD-10-CM

## 2023-03-23 LAB
#MXD IC: 0.4 X10ˆ3/UL (ref 0.1–1)
ATRIAL RATE: 80 BPM
B-HCG UR QL: NEGATIVE
BUN BLD-MCNC: 8 MG/DL (ref 7–18)
CHLORIDE BLD-SCNC: 102 MMOL/L (ref 98–112)
CO2 BLD-SCNC: 25 MMOL/L (ref 21–32)
CREAT BLD-MCNC: 0.7 MG/DL
GFR SERPLBLD BASED ON 1.73 SQ M-ARVRAT: 118 ML/MIN/1.73M2 (ref 60–?)
GLUCOSE BLD-MCNC: 91 MG/DL (ref 70–99)
HCT VFR BLD AUTO: 36.2 %
HCT VFR BLD CALC: 39 %
HGB BLD-MCNC: 10.6 G/DL
ISTAT IONIZED CALCIUM FOR CHEM 8: 1.18 MMOL/L (ref 1.12–1.32)
LYMPHOCYTES # BLD AUTO: 2.7 X10ˆ3/UL (ref 1–4)
LYMPHOCYTES NFR BLD AUTO: 29.9 %
MCH RBC QN AUTO: 22 PG (ref 26–34)
MCHC RBC AUTO-ENTMCNC: 29.3 G/DL (ref 31–37)
MCV RBC AUTO: 75.3 FL (ref 80–100)
MIXED CELL %: 4.3 %
NEUTROPHILS # BLD AUTO: 5.9 X10ˆ3/UL (ref 1.5–7.7)
NEUTROPHILS NFR BLD AUTO: 65.8 %
P AXIS: 30 DEGREES
P-R INTERVAL: 166 MS
PLATELET # BLD AUTO: 314 X10ˆ3/UL (ref 150–450)
POTASSIUM BLD-SCNC: 4 MMOL/L (ref 3.6–5.1)
Q-T INTERVAL: 366 MS
QRS DURATION: 78 MS
QTC CALCULATION (BEZET): 422 MS
R AXIS: -2 DEGREES
RBC # BLD AUTO: 4.81 X10ˆ6/UL
SARS-COV-2 RNA RESP QL NAA+PROBE: NOT DETECTED
SODIUM BLD-SCNC: 138 MMOL/L (ref 136–145)
T AXIS: 34 DEGREES
TROPONIN I BLD-MCNC: <0.02 NG/ML
VENTRICULAR RATE: 80 BPM
WBC # BLD AUTO: 9 X10ˆ3/UL (ref 4–11)

## 2023-03-23 PROCEDURE — 84484 ASSAY OF TROPONIN QUANT: CPT | Performed by: NURSE PRACTITIONER

## 2023-03-23 PROCEDURE — 80047 BASIC METABLC PNL IONIZED CA: CPT | Performed by: NURSE PRACTITIONER

## 2023-03-23 PROCEDURE — 93000 ELECTROCARDIOGRAM COMPLETE: CPT | Performed by: NURSE PRACTITIONER

## 2023-03-23 PROCEDURE — 85025 COMPLETE CBC W/AUTO DIFF WBC: CPT | Performed by: NURSE PRACTITIONER

## 2023-03-23 PROCEDURE — 99213 OFFICE O/P EST LOW 20 MIN: CPT | Performed by: NURSE PRACTITIONER

## 2023-03-23 PROCEDURE — 81025 URINE PREGNANCY TEST: CPT | Performed by: NURSE PRACTITIONER

## 2023-03-23 PROCEDURE — U0002 COVID-19 LAB TEST NON-CDC: HCPCS | Performed by: NURSE PRACTITIONER

## 2023-03-23 NOTE — ED INITIAL ASSESSMENT (HPI)
Pt states woke up feeling lightheaded, thought I was just because she was sitting up too quickly. 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 Pt states feeling out of it and that her head is floating. Pt denies vision changes. Pt denies SOB or palpitations.  Pt denies this ever happening in the past.

## 2023-03-27 ENCOUNTER — OFFICE VISIT (OUTPATIENT)
Dept: FAMILY MEDICINE CLINIC | Facility: CLINIC | Age: 33
End: 2023-03-27
Payer: COMMERCIAL

## 2023-03-27 VITALS
TEMPERATURE: 98 F | WEIGHT: 186 LBS | OXYGEN SATURATION: 98 % | RESPIRATION RATE: 16 BRPM | HEIGHT: 67.72 IN | SYSTOLIC BLOOD PRESSURE: 120 MMHG | DIASTOLIC BLOOD PRESSURE: 74 MMHG | HEART RATE: 101 BPM | BODY MASS INDEX: 28.52 KG/M2

## 2023-03-27 DIAGNOSIS — J02.0 STREP PHARYNGITIS: Primary | ICD-10-CM

## 2023-03-27 DIAGNOSIS — Z20.818 EXPOSURE TO STREP THROAT: ICD-10-CM

## 2023-03-27 LAB
CONTROL LINE PRESENT WITH A CLEAR BACKGROUND (YES/NO): YES YES/NO
STREP GRP A CUL-SCR: POSITIVE

## 2023-03-27 RX ORDER — AMOXICILLIN 875 MG/1
875 TABLET, COATED ORAL 2 TIMES DAILY
Qty: 20 TABLET | Refills: 0 | Status: SHIPPED | OUTPATIENT
Start: 2023-03-27 | End: 2023-04-06

## 2023-03-27 RX ORDER — CLINDAMYCIN PHOSPHATE 10 UG/ML
1 LOTION TOPICAL EVERY MORNING
COMMUNITY
Start: 2023-02-13

## 2023-03-27 RX ORDER — FLUCONAZOLE 150 MG/1
TABLET ORAL
COMMUNITY
Start: 2023-01-22

## 2023-04-22 ENCOUNTER — LAB ENCOUNTER (OUTPATIENT)
Dept: LAB | Facility: HOSPITAL | Age: 33
End: 2023-04-22
Attending: FAMILY MEDICINE
Payer: COMMERCIAL

## 2023-04-22 ENCOUNTER — OFFICE VISIT (OUTPATIENT)
Dept: ENDOCRINOLOGY CLINIC | Facility: CLINIC | Age: 33
End: 2023-04-22

## 2023-04-22 VITALS
SYSTOLIC BLOOD PRESSURE: 103 MMHG | HEART RATE: 91 BPM | DIASTOLIC BLOOD PRESSURE: 68 MMHG | WEIGHT: 188 LBS | BODY MASS INDEX: 29 KG/M2

## 2023-04-22 DIAGNOSIS — E04.2 MULTIPLE THYROID NODULES: Primary | ICD-10-CM

## 2023-04-22 DIAGNOSIS — E04.2 MULTIPLE THYROID NODULES: ICD-10-CM

## 2023-04-22 LAB
T3FREE SERPL-MCNC: 2.93 PG/ML (ref 2.4–4.2)
T4 FREE SERPL-MCNC: 1.2 NG/DL (ref 0.8–1.7)
THYROPEROXIDASE AB SERPL-ACNC: 55 U/ML (ref ?–60)
TSI SER-ACNC: 0.51 MIU/ML (ref 0.36–3.74)

## 2023-04-22 PROCEDURE — 84439 ASSAY OF FREE THYROXINE: CPT

## 2023-04-22 PROCEDURE — 84445 ASSAY OF TSI GLOBULIN: CPT

## 2023-04-22 PROCEDURE — 86376 MICROSOMAL ANTIBODY EACH: CPT

## 2023-04-22 PROCEDURE — 36415 COLL VENOUS BLD VENIPUNCTURE: CPT

## 2023-04-22 PROCEDURE — 84481 FREE ASSAY (FT-3): CPT

## 2023-04-22 PROCEDURE — 84443 ASSAY THYROID STIM HORMONE: CPT

## 2023-04-24 ENCOUNTER — PATIENT MESSAGE (OUTPATIENT)
Dept: ENDOCRINOLOGY CLINIC | Facility: CLINIC | Age: 33
End: 2023-04-24

## 2023-04-24 NOTE — TELEPHONE ENCOUNTER
From: Mandeep Ernst  To: Elmer Dia MD  Sent: 4/24/2023 3:18 PM CDT  Subject: Question regarding ASSAY, THYROID STIM HORMONE    Hey are the finals results back yet?

## 2023-04-25 LAB — THY STIM IMMUNO: <0.1 IU/L

## 2023-06-23 ENCOUNTER — OFFICE VISIT (OUTPATIENT)
Dept: FAMILY MEDICINE CLINIC | Facility: CLINIC | Age: 33
End: 2023-06-23
Payer: COMMERCIAL

## 2023-06-23 VITALS
HEIGHT: 67.75 IN | DIASTOLIC BLOOD PRESSURE: 64 MMHG | OXYGEN SATURATION: 99 % | RESPIRATION RATE: 16 BRPM | WEIGHT: 189 LBS | BODY MASS INDEX: 28.98 KG/M2 | HEART RATE: 87 BPM | TEMPERATURE: 98 F | SYSTOLIC BLOOD PRESSURE: 124 MMHG

## 2023-06-23 DIAGNOSIS — J45.909 UNCOMPLICATED ASTHMA, UNSPECIFIED ASTHMA SEVERITY, UNSPECIFIED WHETHER PERSISTENT: ICD-10-CM

## 2023-06-23 DIAGNOSIS — J06.9 VIRAL URI WITH COUGH: Primary | ICD-10-CM

## 2023-06-23 PROCEDURE — 3078F DIAST BP <80 MM HG: CPT | Performed by: NURSE PRACTITIONER

## 2023-06-23 PROCEDURE — 3074F SYST BP LT 130 MM HG: CPT | Performed by: NURSE PRACTITIONER

## 2023-06-23 PROCEDURE — 99213 OFFICE O/P EST LOW 20 MIN: CPT | Performed by: NURSE PRACTITIONER

## 2023-06-23 PROCEDURE — 3008F BODY MASS INDEX DOCD: CPT | Performed by: NURSE PRACTITIONER

## 2023-06-23 RX ORDER — ALBUTEROL SULFATE 90 UG/1
2 AEROSOL, METERED RESPIRATORY (INHALATION)
Qty: 1 EACH | Refills: 0 | Status: SHIPPED | OUTPATIENT
Start: 2023-06-23

## 2023-09-05 ENCOUNTER — HOSPITAL ENCOUNTER (EMERGENCY)
Facility: HOSPITAL | Age: 33
Discharge: HOME | End: 2023-09-05
Attending: EMERGENCY MEDICINE
Payer: COMMERCIAL

## 2023-09-05 ENCOUNTER — APPOINTMENT (OUTPATIENT)
Dept: CT IMAGING | Facility: HOSPITAL | Age: 33
End: 2023-09-05
Attending: EMERGENCY MEDICINE
Payer: COMMERCIAL

## 2023-09-05 VITALS
BODY MASS INDEX: 29.89 KG/M2 | TEMPERATURE: 99 F | WEIGHT: 186 LBS | DIASTOLIC BLOOD PRESSURE: 78 MMHG | HEART RATE: 78 BPM | OXYGEN SATURATION: 99 % | RESPIRATION RATE: 18 BRPM | HEIGHT: 66 IN | SYSTOLIC BLOOD PRESSURE: 114 MMHG

## 2023-09-05 DIAGNOSIS — N83.202 CYST OF LEFT OVARY: Primary | ICD-10-CM

## 2023-09-05 LAB
ALBUMIN SERPL-MCNC: 3.7 G/DL (ref 3.4–5)
ALBUMIN/GLOB SERPL: 0.9 {RATIO} (ref 1–2)
ALP LIVER SERPL-CCNC: 67 U/L
ALT SERPL-CCNC: 17 U/L
ANION GAP SERPL CALC-SCNC: 6 MMOL/L (ref 0–18)
AST SERPL-CCNC: 8 U/L (ref 15–37)
B-HCG UR QL: NEGATIVE
BASOPHILS # BLD AUTO: 0.05 X10(3) UL (ref 0–0.2)
BASOPHILS NFR BLD AUTO: 0.5 %
BILIRUB SERPL-MCNC: 0.2 MG/DL (ref 0.1–2)
BILIRUB UR QL: NEGATIVE
BUN BLD-MCNC: 10 MG/DL (ref 7–18)
BUN/CREAT SERPL: 13 (ref 10–20)
CALCIUM BLD-MCNC: 9.1 MG/DL (ref 8.5–10.1)
CHLORIDE SERPL-SCNC: 107 MMOL/L (ref 98–112)
CLARITY UR: CLEAR
CO2 SERPL-SCNC: 25 MMOL/L (ref 21–32)
COLOR UR: COLORLESS
CREAT BLD-MCNC: 0.77 MG/DL
DEPRECATED RDW RBC AUTO: 40.1 FL (ref 35.1–46.3)
EGFRCR SERPLBLD CKD-EPI 2021: 104 ML/MIN/1.73M2 (ref 60–?)
EOSINOPHIL # BLD AUTO: 0.37 X10(3) UL (ref 0–0.7)
EOSINOPHIL NFR BLD AUTO: 3.9 %
ERYTHROCYTE [DISTWIDTH] IN BLOOD BY AUTOMATED COUNT: 15.4 % (ref 11–15)
GLOBULIN PLAS-MCNC: 4.3 G/DL (ref 2.8–4.4)
GLUCOSE BLD-MCNC: 111 MG/DL (ref 70–99)
GLUCOSE UR-MCNC: NORMAL MG/DL
HCT VFR BLD AUTO: 34.9 %
HGB BLD-MCNC: 10.4 G/DL
HGB UR QL STRIP.AUTO: NEGATIVE
IMM GRANULOCYTES # BLD AUTO: 0.02 X10(3) UL (ref 0–1)
IMM GRANULOCYTES NFR BLD: 0.2 %
KETONES UR-MCNC: NEGATIVE MG/DL
LEUKOCYTE ESTERASE UR QL STRIP.AUTO: NEGATIVE
LIPASE SERPL-CCNC: 23 U/L (ref 13–75)
LYMPHOCYTES # BLD AUTO: 2.92 X10(3) UL (ref 1–4)
LYMPHOCYTES NFR BLD AUTO: 31.1 %
MCH RBC QN AUTO: 21.6 PG (ref 26–34)
MCHC RBC AUTO-ENTMCNC: 29.8 G/DL (ref 31–37)
MCV RBC AUTO: 72.4 FL
MONOCYTES # BLD AUTO: 0.4 X10(3) UL (ref 0.1–1)
MONOCYTES NFR BLD AUTO: 4.3 %
NEUTROPHILS # BLD AUTO: 5.63 X10 (3) UL (ref 1.5–7.7)
NEUTROPHILS # BLD AUTO: 5.63 X10(3) UL (ref 1.5–7.7)
NEUTROPHILS NFR BLD AUTO: 60 %
NITRITE UR QL STRIP.AUTO: NEGATIVE
OSMOLALITY SERPL CALC.SUM OF ELEC: 286 MOSM/KG (ref 275–295)
PH UR: 6 [PH] (ref 5–8)
PLATELET # BLD AUTO: 307 10(3)UL (ref 150–450)
POTASSIUM SERPL-SCNC: 3.6 MMOL/L (ref 3.5–5.1)
PROT SERPL-MCNC: 8 G/DL (ref 6.4–8.2)
PROT UR-MCNC: NEGATIVE MG/DL
RBC # BLD AUTO: 4.82 X10(6)UL
SODIUM SERPL-SCNC: 138 MMOL/L (ref 136–145)
SP GR UR STRIP: 1.01 (ref 1–1.03)
UROBILINOGEN UR STRIP-ACNC: NORMAL
WBC # BLD AUTO: 9.4 X10(3) UL (ref 4–11)

## 2023-09-05 PROCEDURE — 83690 ASSAY OF LIPASE: CPT | Performed by: EMERGENCY MEDICINE

## 2023-09-05 PROCEDURE — 80053 COMPREHEN METABOLIC PANEL: CPT | Performed by: EMERGENCY MEDICINE

## 2023-09-05 PROCEDURE — 85025 COMPLETE CBC W/AUTO DIFF WBC: CPT | Performed by: EMERGENCY MEDICINE

## 2023-09-05 PROCEDURE — 81025 URINE PREGNANCY TEST: CPT

## 2023-09-05 PROCEDURE — 85025 COMPLETE CBC W/AUTO DIFF WBC: CPT

## 2023-09-05 PROCEDURE — 93005 ELECTROCARDIOGRAM TRACING: CPT

## 2023-09-05 PROCEDURE — 74177 CT ABD & PELVIS W/CONTRAST: CPT | Performed by: EMERGENCY MEDICINE

## 2023-09-05 PROCEDURE — 83690 ASSAY OF LIPASE: CPT

## 2023-09-05 PROCEDURE — 96374 THER/PROPH/DIAG INJ IV PUSH: CPT

## 2023-09-05 PROCEDURE — 93010 ELECTROCARDIOGRAM REPORT: CPT

## 2023-09-05 PROCEDURE — 99284 EMERGENCY DEPT VISIT MOD MDM: CPT

## 2023-09-05 PROCEDURE — 80053 COMPREHEN METABOLIC PANEL: CPT

## 2023-09-05 RX ORDER — KETOROLAC TROMETHAMINE 15 MG/ML
15 INJECTION, SOLUTION INTRAMUSCULAR; INTRAVENOUS ONCE
Status: COMPLETED | OUTPATIENT
Start: 2023-09-05 | End: 2023-09-05

## 2023-09-06 ENCOUNTER — TELEPHONE (OUTPATIENT)
Facility: CLINIC | Age: 33
End: 2023-09-06

## 2023-09-06 NOTE — TELEPHONE ENCOUNTER
Pt was seen in ER yesterday, ruptured cyst, today around Noon temp 102.5, took 2 ES tylenol, less pain but still have pain 4/10, advised ER , state she was 5 mins from home will recheck temp and call back but will go back to ER if Temp still elevated    CT  Impression   CONCLUSION:     1. A 2.5 cm left adnexal cystic lesion with a small amount of adjacent pelvic free fluid. Findings may reflect a ruptured ovarian cyst in the appropriate clinical setting. Consider correlation with pelvic ultrasound.

## 2023-09-06 NOTE — TELEPHONE ENCOUNTER
Patient was notified with understanding. She states she cannot go now because she has her kids with her. I advised her to monitor symptoms very closely and report back to ER to avoid sepsis or pelvic infection. She verbalized understanding and compliance.

## 2023-09-07 ENCOUNTER — TELEPHONE (OUTPATIENT)
Facility: CLINIC | Age: 33
End: 2023-09-07

## 2023-09-07 LAB
ATRIAL RATE: 84 BPM
P AXIS: 44 DEGREES
P-R INTERVAL: 162 MS
Q-T INTERVAL: 352 MS
QRS DURATION: 90 MS
QTC CALCULATION (BEZET): 415 MS
R AXIS: -4 DEGREES
T AXIS: 36 DEGREES
VENTRICULAR RATE: 84 BPM

## 2023-10-09 ENCOUNTER — OFFICE VISIT (OUTPATIENT)
Dept: SURGERY | Facility: CLINIC | Age: 33
End: 2023-10-09
Payer: COMMERCIAL

## 2023-10-09 VITALS
BODY MASS INDEX: 28.21 KG/M2 | SYSTOLIC BLOOD PRESSURE: 120 MMHG | WEIGHT: 184 LBS | DIASTOLIC BLOOD PRESSURE: 76 MMHG | HEIGHT: 67.75 IN

## 2023-10-09 DIAGNOSIS — Z12.4 SCREENING FOR CERVICAL CANCER: ICD-10-CM

## 2023-10-09 DIAGNOSIS — Z01.419 WOMEN'S ANNUAL ROUTINE GYNECOLOGICAL EXAMINATION: Primary | ICD-10-CM

## 2023-10-09 DIAGNOSIS — N83.202 LEFT OVARIAN CYST: ICD-10-CM

## 2023-10-09 PROCEDURE — 99213 OFFICE O/P EST LOW 20 MIN: CPT | Performed by: OBSTETRICS & GYNECOLOGY

## 2023-10-09 PROCEDURE — 3078F DIAST BP <80 MM HG: CPT | Performed by: OBSTETRICS & GYNECOLOGY

## 2023-10-09 PROCEDURE — 99395 PREV VISIT EST AGE 18-39: CPT | Performed by: OBSTETRICS & GYNECOLOGY

## 2023-10-09 PROCEDURE — 87624 HPV HI-RISK TYP POOLED RSLT: CPT | Performed by: OBSTETRICS & GYNECOLOGY

## 2023-10-09 PROCEDURE — 3008F BODY MASS INDEX DOCD: CPT | Performed by: OBSTETRICS & GYNECOLOGY

## 2023-10-09 PROCEDURE — 3074F SYST BP LT 130 MM HG: CPT | Performed by: OBSTETRICS & GYNECOLOGY

## 2023-10-10 LAB — HPV I/H RISK 1 DNA SPEC QL NAA+PROBE: NEGATIVE

## 2023-10-13 ENCOUNTER — OFFICE VISIT (OUTPATIENT)
Dept: FAMILY MEDICINE CLINIC | Facility: CLINIC | Age: 33
End: 2023-10-13
Payer: COMMERCIAL

## 2023-10-13 VITALS
SYSTOLIC BLOOD PRESSURE: 118 MMHG | HEIGHT: 66 IN | DIASTOLIC BLOOD PRESSURE: 78 MMHG | TEMPERATURE: 98 F | BODY MASS INDEX: 30.53 KG/M2 | WEIGHT: 190 LBS | HEART RATE: 89 BPM | OXYGEN SATURATION: 98 %

## 2023-10-13 DIAGNOSIS — R05.9 COUGH, UNSPECIFIED TYPE: ICD-10-CM

## 2023-10-13 DIAGNOSIS — J22 LOWER RESPIRATORY INFECTION: Primary | ICD-10-CM

## 2023-10-13 LAB
OPERATOR ID: NORMAL
POCT LOT NUMBER: NORMAL
RAPID SARS-COV-2 BY PCR: NOT DETECTED

## 2023-10-13 PROCEDURE — 99213 OFFICE O/P EST LOW 20 MIN: CPT | Performed by: PHYSICIAN ASSISTANT

## 2023-10-13 PROCEDURE — 3074F SYST BP LT 130 MM HG: CPT | Performed by: PHYSICIAN ASSISTANT

## 2023-10-13 PROCEDURE — U0002 COVID-19 LAB TEST NON-CDC: HCPCS | Performed by: PHYSICIAN ASSISTANT

## 2023-10-13 PROCEDURE — 3008F BODY MASS INDEX DOCD: CPT | Performed by: PHYSICIAN ASSISTANT

## 2023-10-13 PROCEDURE — 3078F DIAST BP <80 MM HG: CPT | Performed by: PHYSICIAN ASSISTANT

## 2023-10-13 RX ORDER — AZITHROMYCIN 250 MG/1
TABLET, FILM COATED ORAL
Qty: 6 TABLET | Refills: 0 | Status: SHIPPED | OUTPATIENT
Start: 2023-10-13 | End: 2023-10-18

## 2023-10-13 RX ORDER — PREDNISONE 20 MG/1
TABLET ORAL
Qty: 10 TABLET | Refills: 0 | Status: SHIPPED | OUTPATIENT
Start: 2023-10-13

## 2023-10-13 RX ORDER — ALBUTEROL SULFATE 90 UG/1
AEROSOL, METERED RESPIRATORY (INHALATION)
Qty: 1 EACH | Refills: 0 | Status: SHIPPED | OUTPATIENT
Start: 2023-10-13

## 2023-10-16 ENCOUNTER — NURSE TRIAGE (OUTPATIENT)
Facility: CLINIC | Age: 33
End: 2023-10-16

## 2023-10-16 ENCOUNTER — PATIENT MESSAGE (OUTPATIENT)
Facility: CLINIC | Age: 33
End: 2023-10-16

## 2023-12-24 NOTE — TELEPHONE ENCOUNTER
If pain worsens or has any other new symptoms, needs to go to ED. Otherwise, she may continue tylenol prn. Thank you.
Message was read by patient 9/07/2023.
Patient called (identified name and ),   Seen in ED 2023 for ruptured left ovarian cyst.  Had temp of 80 F yesterday,  Today has temp of 100.7. She is asking if she should be concerned about low grade temp? Dr Nel Meyer, please advise. She has been taking Tylenol. Has mild cramping pain. Advised her to call GYN for appointment to follow up ED visit. She did call and make the appointment.   Future Appointments   Date Time Provider Apollo Mireles   2023  2:00 PM Marielena Sanchez MD [facility] OP [facility]
Unable to reach, mailbox full/sent to [employer]
None known

## 2024-03-01 ENCOUNTER — HOSPITAL ENCOUNTER (OUTPATIENT)
Dept: ULTRASOUND IMAGING | Age: 34
Discharge: HOME | End: 2024-03-01
Attending: INTERNAL MEDICINE
Payer: COMMERCIAL

## 2024-03-01 DIAGNOSIS — E04.2 MULTIPLE THYROID NODULES: ICD-10-CM

## 2024-03-01 PROCEDURE — 76536 US EXAM OF HEAD AND NECK: CPT | Performed by: INTERNAL MEDICINE

## 2024-05-17 ENCOUNTER — TELEPHONE (OUTPATIENT)
Facility: CLINIC | Age: 34
End: 2024-05-17

## 2024-05-17 NOTE — TELEPHONE ENCOUNTER
Name:VALERIE                      2024 8:03:00 AM  ProfileId:                        PagerID       [phone]  Department:[facility]  ======================================================================  Paging    Message # [phone]         2024 06:19a   [ALFREDA]  To:  From:  CONSOLE  Primary MD:  Phone#:  ----------------------------------------------------------------------  JEFFRY -[phone] RE; HANDS ARE SWOLLEN,  90 Paged at number :  PAGE: [phone] at : May- 06:19            (Message Delivered)   D E L I V E R I E S :  2024 06:19a           ALFREDA     Delivered  ======================================================================

## 2024-06-05 ENCOUNTER — OFFICE VISIT (OUTPATIENT)
Dept: OTOLARYNGOLOGY | Facility: CLINIC | Age: 34
End: 2024-06-05

## 2024-06-05 ENCOUNTER — OFFICE VISIT (OUTPATIENT)
Dept: AUDIOLOGY | Facility: CLINIC | Age: 34
End: 2024-06-05

## 2024-06-05 DIAGNOSIS — H91.90 HEARING LOSS, UNSPECIFIED HEARING LOSS TYPE, UNSPECIFIED LATERALITY: ICD-10-CM

## 2024-06-05 DIAGNOSIS — H93.11 RIGHT-SIDED TINNITUS: ICD-10-CM

## 2024-06-05 DIAGNOSIS — H93.11 TINNITUS, RIGHT: Primary | ICD-10-CM

## 2024-06-05 DIAGNOSIS — J34.3 NASAL TURBINATE HYPERTROPHY: ICD-10-CM

## 2024-06-05 DIAGNOSIS — J38.2 VOCAL CORD NODULES: Primary | ICD-10-CM

## 2024-06-05 DIAGNOSIS — E04.2 MULTIPLE THYROID NODULES: ICD-10-CM

## 2024-06-05 PROCEDURE — 99204 OFFICE O/P NEW MOD 45 MIN: CPT | Performed by: SPECIALIST

## 2024-06-05 PROCEDURE — 92557 COMPREHENSIVE HEARING TEST: CPT | Performed by: AUDIOLOGIST

## 2024-06-05 PROCEDURE — 92567 TYMPANOMETRY: CPT | Performed by: AUDIOLOGIST

## 2024-06-06 NOTE — PATIENT INSTRUCTIONS
You have bilateral vocal cord nodules.  We discussed voice rest as well as speech therapy.  You have a normal audiogram with right-sided tinnitus.  You can reduce your sodium and caffeine.  You have thyroid nodules with a normal TSH, T3 and T4 from April 2023.  Workup of Hashimoto's thyroiditis also negative.  Continue your Flonase for your nasal turbinate congestion and restart your Singulair.

## 2024-06-06 NOTE — PROGRESS NOTES
Yodit Pettit is a 33 year old female.   Chief Complaint   Patient presents with    Ear Problem     Patient reports fan noise in right ear. Reports some pain.      HPI:   Patient here with right-sided tinnitus.  Also he has voice changes.    Current Outpatient Medications   Medication Sig Dispense Refill    albuterol (PROAIR HFA) 108 (90 Base) MCG/ACT Inhalation Aero Soln 2 puffs every 4-6 hours prn wheezing or shortness of breath 1 each 0    albuterol 108 (90 Base) MCG/ACT Inhalation Aero Soln Inhale 2 puffs into the lungs every 4 to 6 hours as needed for Wheezing or Shortness of Breath. 1 each 0    clindamycin 1 % External Lotion Apply 1 Application topically every morning. APPLY TO FACE      Rizatriptan Benzoate 5 MG Oral Tab Take 1 tablet (5 mg total) by mouth as needed for Migraine. 30 tablet 0    predniSONE 20 MG Oral Tab Take 2 tablets po daily for 5 days (Patient not taking: Reported on 6/5/2024) 10 tablet 0      Past Medical History:    Abnormal Pap smear of cervix    Acne    Allergies    Anemia    Anemia during pregnancy in third trimester ([facility])    Hematology consult. Completed IV Venofer weekly x 5  from 3/5-3/15/21.    Asthma ([facility])    Decorative tattoo    last 02/2015    History of retained placenta in prior pregnancy, currently pregnant ([facility])    History of use of contraceptive intrauterine device (IUD)    Paragard IUD 01/2017-03/2020    Human papilloma virus infection    Migraine with aura and without status migrainosus, not intractable    Multigravida in third trimester ([facility])    Placenta succenturiate lobe affecting fetus    Prediabetes    Pregnancy ([facility])    Pregnancy examination or test, positive result ([facility])    Pregnancy related condition in third trimester ([facility])    Thyroid disease    thyroid nodule      Social History:  Social History     Socioeconomic History    Marital status:    Occupational History    Occupation: Teacher      Comment: [employer]   Tobacco Use    Smoking status: Never     Smokeless tobacco: Never   Vaping Use    Vaping status: Never Used   Substance and Sexual Activity    Alcohol use: Never    Drug use: Never    Sexual activity: Yes     Partners: Male     Comment: active with     Other Topics Concern    Blood Transfusions No   Social History Narrative    ** Merged History Encounter **         No H/O abuse         REVIEW OF SYSTEMS:   GENERAL HEALTH: feels well otherwise  GENERAL : denies fever, chills, sweats, weight loss, weight gain  SKIN: denies any unusual skin lesions or rashes  RESPIRATORY: denies shortness of breath with exertion  NEURO: denies headaches    EXAM:   LMP 08/15/2023 (Exact Date)   System Details   Skin Inspection - Normal.   Constitutional Overall appearance - Normal.   Head/Face Facial features - Normal. Eyebrows - Normal. Skull - Normal.   Eyes Conjunctiva - Right: Normal, Left: Normal. Pupil - Right: Normal, Left: Normal.    Ears Inspection - Right: Normal, Left: Normal.   Canal - Right: Normal, Left: Normal.   TM - Right: Normal, Left: Normal.   Nasal External nose - Normal.   Nasal septum - Normal.  Turbinates -congestion, no purulence or polyps noted   Oral/Oropharynx Lips - Normal, Tonsils - Normal, Tongue - Normal    Neck Exam Inspection - Normal. Palpation - Normal. Parotid gland - Normal. Thyroid gland - Normal.  No carotid bruits by auscultation   Lymph Detail Submental. Submandibular. Anterior cervical. Posterior cervical. Supraclavicular all without enlargement   Larynx   Bilateral vocal cord nodules.  Normal vocal cord mobility.  No retained secretions.  No tumors or masses seen.   Psychiatric Orientation - Oriented to time, place, person & situation. Appropriate mood and affect.   Neurological Memory - Normal. Cranial nerves - Cranial nerves II through XII grossly intact.     ASSESSMENT AND PLAN:   1. Hearing loss, unspecified hearing loss type, unspecified laterality  Audiogram reviewed with patient at the time of the visit.  This shows  normal bilateral hearing.  - Audiology Referral - [place] ([facility])    2. Vocal cord nodules  Bilateral vocal cord nodules by mirror examination.  - Speech Therapy Referral - [place] Locations    3. Right-sided tinnitus  Normal audiogram and no carotid bruits.  Patient to reduce sodium and caffeine.    4. Multiple thyroid nodules  Palpable right thyroid nodules.  Reviewed thyroid ultrasound which showed subcentimeter nodules.  Not needing biopsy.  Would repeat ultrasound in 1 year's time, sooner if problems.  Also reviewed T3-T4 and TSH from April 2023 all of which were within normal limits.  Also reviewed lab work for Hashimoto's thyroiditis which was also negative.    5. Nasal turbinate hypertrophy  Flonase and restart Singulair.      The patient indicates understanding of these issues and agrees to the plan.      Narda Holley MD  6/5/2024  8:34 PM

## 2024-07-11 ENCOUNTER — OFFICE VISIT (OUTPATIENT)
Dept: FAMILY MEDICINE CLINIC | Facility: CLINIC | Age: 34
End: 2024-07-11
Payer: COMMERCIAL

## 2024-07-11 DIAGNOSIS — Z11.1 VISIT FOR TB SKIN TEST: Primary | ICD-10-CM

## 2024-07-11 DIAGNOSIS — Z11.1 SCREENING FOR TUBERCULOSIS: ICD-10-CM

## 2024-07-11 PROCEDURE — 86580 TB INTRADERMAL TEST: CPT | Performed by: NURSE PRACTITIONER

## 2024-07-11 NOTE — PROGRESS NOTES
You will need to return to clinic in 48-72 hours to have results of TB test read.    Please return to clinic on 7/12 after 5:30 pm or on 7/13 between 6:30 am and 2:30 pm have your TB test read.    If you do not return during this time your test will need to be repeated.     Our clinic hours are:  Monday-Friday        8:00 am to 7:30 pm  Saturday/Sunday    9:00 am to 4:30 pm  [place]  Monday-Friday       6:30 am to 6:30 pm  Saturday/Sunday   6:30 am to 2:30 pm    You can go to any of the [facility] Walk-In Clinics to have your TB test read.  To find your nearest Walk-In Clinic go to [email or web address]

## 2024-07-12 ENCOUNTER — TELEPHONE (OUTPATIENT)
Facility: CLINIC | Age: 34
End: 2024-07-12

## 2024-07-12 NOTE — TELEPHONE ENCOUNTER
Patient is calling requesting appointment for a work physical due to she is starting a new job and it was required by her new employer she starts on 7/23/2024. Please assist.

## 2024-07-14 ENCOUNTER — OFFICE VISIT (OUTPATIENT)
Dept: FAMILY MEDICINE CLINIC | Facility: CLINIC | Age: 34
End: 2024-07-14
Payer: COMMERCIAL

## 2024-07-14 DIAGNOSIS — Z11.1 ENCOUNTER FOR PPD SKIN TEST READING: Primary | ICD-10-CM

## 2024-07-14 LAB — INDURATION (): 0 MM (ref 0–11)

## 2024-07-18 ENCOUNTER — LAB ENCOUNTER (OUTPATIENT)
Dept: LAB | Age: 34
End: 2024-07-18
Attending: FAMILY MEDICINE
Payer: COMMERCIAL

## 2024-07-18 ENCOUNTER — EKG ENCOUNTER (OUTPATIENT)
Dept: LAB | Age: 34
End: 2024-07-18
Attending: FAMILY MEDICINE
Payer: COMMERCIAL

## 2024-07-18 ENCOUNTER — OFFICE VISIT (OUTPATIENT)
Facility: CLINIC | Age: 34
End: 2024-07-18
Payer: COMMERCIAL

## 2024-07-18 VITALS
DIASTOLIC BLOOD PRESSURE: 68 MMHG | HEART RATE: 88 BPM | HEIGHT: 66 IN | BODY MASS INDEX: 30.37 KG/M2 | WEIGHT: 189 LBS | OXYGEN SATURATION: 98 % | SYSTOLIC BLOOD PRESSURE: 112 MMHG

## 2024-07-18 DIAGNOSIS — R00.0 TACHYCARDIA: ICD-10-CM

## 2024-07-18 DIAGNOSIS — Z00.00 PHYSICAL EXAM, ANNUAL: Primary | ICD-10-CM

## 2024-07-18 DIAGNOSIS — Z02.89 ENCOUNTER FOR PHYSICAL EXAMINATION RELATED TO EMPLOYMENT: ICD-10-CM

## 2024-07-18 DIAGNOSIS — Z00.00 PHYSICAL EXAM, ANNUAL: ICD-10-CM

## 2024-07-18 LAB
ALBUMIN SERPL-MCNC: 4.6 G/DL (ref 3.2–4.8)
ALBUMIN/GLOB SERPL: 1.3 {RATIO} (ref 1–2)
ALP LIVER SERPL-CCNC: 74 U/L
ALT SERPL-CCNC: 10 U/L
ANION GAP SERPL CALC-SCNC: 6 MMOL/L (ref 0–18)
AST SERPL-CCNC: 11 U/L (ref ?–34)
BASOPHILS # BLD AUTO: 0.05 X10(3) UL (ref 0–0.2)
BASOPHILS NFR BLD AUTO: 0.5 %
BILIRUB SERPL-MCNC: 0.3 MG/DL (ref 0.3–1.2)
BUN BLD-MCNC: 9 MG/DL (ref 9–23)
BUN/CREAT SERPL: 11.3 (ref 10–20)
CALCIUM BLD-MCNC: 9.8 MG/DL (ref 8.7–10.4)
CHLORIDE SERPL-SCNC: 105 MMOL/L (ref 98–112)
CHOLEST SERPL-MCNC: 179 MG/DL (ref ?–200)
CO2 SERPL-SCNC: 26 MMOL/L (ref 21–32)
CREAT BLD-MCNC: 0.8 MG/DL
DEPRECATED RDW RBC AUTO: 38.7 FL (ref 35.1–46.3)
EGFRCR SERPLBLD CKD-EPI 2021: 100 ML/MIN/1.73M2 (ref 60–?)
EOSINOPHIL # BLD AUTO: 0.22 X10(3) UL (ref 0–0.7)
EOSINOPHIL NFR BLD AUTO: 2.4 %
ERYTHROCYTE [DISTWIDTH] IN BLOOD BY AUTOMATED COUNT: 15.7 % (ref 11–15)
EST. AVERAGE GLUCOSE BLD GHB EST-MCNC: 128 MG/DL (ref 68–126)
FASTING PATIENT LIPID ANSWER: NO
FASTING STATUS PATIENT QL REPORTED: NO
GLOBULIN PLAS-MCNC: 3.6 G/DL (ref 2–3.5)
GLUCOSE BLD-MCNC: 88 MG/DL (ref 70–99)
HBA1C MFR BLD: 6.1 % (ref ?–5.7)
HCT VFR BLD AUTO: 34.1 %
HDLC SERPL-MCNC: 44 MG/DL (ref 40–59)
HGB BLD-MCNC: 10.2 G/DL
IMM GRANULOCYTES # BLD AUTO: 0.02 X10(3) UL (ref 0–1)
IMM GRANULOCYTES NFR BLD: 0.2 %
LDLC SERPL CALC-MCNC: 113 MG/DL (ref ?–100)
LYMPHOCYTES # BLD AUTO: 2.36 X10(3) UL (ref 1–4)
LYMPHOCYTES NFR BLD AUTO: 25.6 %
MCH RBC QN AUTO: 20.9 PG (ref 26–34)
MCHC RBC AUTO-ENTMCNC: 29.9 G/DL (ref 31–37)
MCV RBC AUTO: 70 FL
MONOCYTES # BLD AUTO: 0.4 X10(3) UL (ref 0.1–1)
MONOCYTES NFR BLD AUTO: 4.3 %
NEUTROPHILS # BLD AUTO: 6.18 X10 (3) UL (ref 1.5–7.7)
NEUTROPHILS # BLD AUTO: 6.18 X10(3) UL (ref 1.5–7.7)
NEUTROPHILS NFR BLD AUTO: 67 %
NONHDLC SERPL-MCNC: 135 MG/DL (ref ?–130)
OSMOLALITY SERPL CALC.SUM OF ELEC: 282 MOSM/KG (ref 275–295)
PLATELET # BLD AUTO: 366 10(3)UL (ref 150–450)
POTASSIUM SERPL-SCNC: 3.7 MMOL/L (ref 3.5–5.1)
PROT SERPL-MCNC: 8.2 G/DL (ref 5.7–8.2)
RBC # BLD AUTO: 4.87 X10(6)UL
SODIUM SERPL-SCNC: 137 MMOL/L (ref 136–145)
TRIGL SERPL-MCNC: 121 MG/DL (ref 30–149)
TSI SER-ACNC: 0.58 MIU/ML (ref 0.55–4.78)
VLDLC SERPL CALC-MCNC: 21 MG/DL (ref 0–30)
WBC # BLD AUTO: 9.2 X10(3) UL (ref 4–11)

## 2024-07-18 PROCEDURE — 99213 OFFICE O/P EST LOW 20 MIN: CPT | Performed by: FAMILY MEDICINE

## 2024-07-18 PROCEDURE — 83036 HEMOGLOBIN GLYCOSYLATED A1C: CPT | Performed by: FAMILY MEDICINE

## 2024-07-18 PROCEDURE — 93005 ELECTROCARDIOGRAM TRACING: CPT

## 2024-07-18 PROCEDURE — 80061 LIPID PANEL: CPT | Performed by: FAMILY MEDICINE

## 2024-07-18 PROCEDURE — 3074F SYST BP LT 130 MM HG: CPT | Performed by: FAMILY MEDICINE

## 2024-07-18 PROCEDURE — 3008F BODY MASS INDEX DOCD: CPT | Performed by: FAMILY MEDICINE

## 2024-07-18 PROCEDURE — 80050 GENERAL HEALTH PANEL: CPT | Performed by: FAMILY MEDICINE

## 2024-07-18 PROCEDURE — 3078F DIAST BP <80 MM HG: CPT | Performed by: FAMILY MEDICINE

## 2024-07-18 PROCEDURE — 99395 PREV VISIT EST AGE 18-39: CPT | Performed by: FAMILY MEDICINE

## 2024-07-18 PROCEDURE — 93010 ELECTROCARDIOGRAM REPORT: CPT | Performed by: STUDENT IN AN ORGANIZED HEALTH CARE EDUCATION/TRAINING PROGRAM

## 2024-07-18 NOTE — PROGRESS NOTES
HPI:   Yodit Pettit is a 33 year old female who presents for a complete physical exam.     HR has been elevated the past few days. Had some dizziness. Chest felt tight. Sx have resolved now.     Last pap: negative cotesting in 10/2023 with Dr. Hinson  Last mammogram: Never    Previous colonoscopy: Never   History of STD's: No hx or concerns  History of intimate partner violence: None  Family hx of breast, ovarian, cervical or colon CA: See FH  Diet and exercise: No regular exercise currently. Diet is okay  Immunizations:  Tdap: 03/2021    REVIEW OF SYSTEMS:   GENERAL: feels well otherwise   SKIN: denies any unusual skin lesions  EYES: no vision problems  BREAST: negative  LUNGS: denies shortness of breath  CARDIOVASCULAR: denies chest pain  GI: denies abdominal pain,  No constipation or diarrhea, no hematochezia or melena  : negative  NEURO: denies headaches  PSYCHE: denies depression or anxiety          Current Outpatient Medications   Medication Sig Dispense Refill    albuterol (PROAIR HFA) 108 (90 Base) MCG/ACT Inhalation Aero Soln 2 puffs every 4-6 hours prn wheezing or shortness of breath 1 each 0    albuterol 108 (90 Base) MCG/ACT Inhalation Aero Soln Inhale 2 puffs into the lungs every 4 to 6 hours as needed for Wheezing or Shortness of Breath. 1 each 0    clindamycin 1 % External Lotion Apply 1 Application topically every morning. APPLY TO FACE      Rizatriptan Benzoate 5 MG Oral Tab Take 1 tablet (5 mg total) by mouth as needed for Migraine. 30 tablet 0     No Known Allergies   Past Medical History:    Abnormal Pap smear of cervix    Acne    Allergies    Anemia    Anemia during pregnancy in third trimester ([facility])    Hematology consult. Completed IV Venofer weekly x 5  from 3/5-3/15/21.    Asthma ([facility])    Decorative tattoo    last 02/2015    History of retained placenta in prior pregnancy, currently pregnant ([facility])    History of use of contraceptive intrauterine device (IUD)    Paragard IUD  2017-2020    Human papilloma virus infection    Migraine with aura and without status migrainosus, not intractable    Multigravida in third trimester (HCC)    Placenta succenturiate lobe affecting fetus    Prediabetes    Pregnancy (HCC)    Pregnancy examination or test, positive result ([facility])    Pregnancy related condition in third trimester (HCC)    Thyroid disease    thyroid nodule      Past Surgical History:   Procedure Laterality Date      2021    Colposcopy, cervix w/upper adjacent vagina; w/biopsy(s), cervix      Insert intrauterine device  2017    Paragard IUD     Remove intrauterine device  2020    Tubal ligation  2021      Family History   Problem Relation Age of Onset    Hypertension Paternal Grandmother     Other (Other) Paternal Grandmother       Social History:   Social History     Socioeconomic History    Marital status:    Occupational History    Occupation: Teacher      Comment: [employer]   Tobacco Use    Smoking status: Never    Smokeless tobacco: Never   Vaping Use    Vaping status: Never Used   Substance and Sexual Activity    Alcohol use: Never    Drug use: Never    Sexual activity: Yes     Partners: Male     Comment: active with     Other Topics Concern    Blood Transfusions No   Social History Narrative    ** Merged History Encounter **         No H/O abuse             EXAM:     Wt Readings from Last 6 Encounters:   24 189 lb (85.7 kg)   10/13/23 190 lb (86.2 kg)   10/09/23 184 lb (83.5 kg)   23 186 lb (84.4 kg)   23 189 lb (85.7 kg)   23 188 lb (85.3 kg)     Body mass index is 30.51 kg/m².   /68   Pulse 88   Ht 5' 6\" (1.676 m)   Wt 189 lb (85.7 kg)   LMP 2024 (Exact Date)   SpO2 98%   Breastfeeding No   BMI 30.51 kg/m²       GENERAL: well developed, well nourished, in no apparent distress   SKIN: no rashes, no suspicious lesions  HEENT: atraumatic, normocephalic, throat clear; normal dentition. MMM.  TM & EAC normal b/l.   EYES: PERRLA, EOMI, conjunctiva are clear  NECK: supple, no adenopathy or thyroid masses   LUNGS: clear to auscultation  CARDIO: RRR without murmur  GI: good bowel sounds, no masses, HSM or tenderness  EXTREMITIES: no edema  NEURO: Alert & oriented, motor & sensation grossly intact and symmetric    Cholesterol, Total (mg/dL)   Date Value   10/11/2021 182     HDL Cholesterol (mg/dL)   Date Value   10/11/2021 65 (H)     LDL Cholesterol (mg/dL)   Date Value   10/11/2021 106 (H)      ASSESSMENT AND PLAN:   Yodit Pettit is a 33 year old female who presents for a complete physical exam.  Encounter Diagnoses   Name Primary?    Physical exam, annual Yes    Encounter for physical examination related to employment     Tachycardia      Orders Placed This Encounter   Procedures    Hemoglobin A1C    CBC With Differential With Platelet    Comp Metabolic Panel (14)    TSH W Reflex To Free T4    Lipid Panel       -Tachycardia: Normal exam today. Check labs & EKG.   -Annual labs ordered.   -Employment physical form signed & returned to patient.     Discussed with patient the following:  -Breast cancer screening/mammograms and clinical breast exams  -Cervical cancer screening/pap smears  -Colon cancer screening/colonoscopy  -Adequate calcium and Vitamin D intake to prevent osteoporosis  -Bone density screening for osteopenia/osteoporosis  -Healthy diet including adequate intake of vegetables and fruits, appropriate portion sizes, minimizing highly concentrated carbohydrate foods  -Exercising 30 minutes a day most days of the week   -Diabetes screening    -Cholesterol screening   -Recommendation for yearly influenza vaccine  -Need for Tdap once as an adult and Td booster every 10 years    All questions were answered during the visit and the patient verbalizes understanding. She will return in one year for next WWE or sooner as needed.    Meds & Refills for this Visit:  Requested Prescriptions      No  prescriptions requested or ordered in this encounter       Imaging & Consults:  EKG 12-LEAD    Denver Wilson DO  7/18/2024  2:41 PM

## 2024-07-19 LAB
ATRIAL RATE: 98 BPM
P AXIS: 33 DEGREES
P-R INTERVAL: 140 MS
Q-T INTERVAL: 330 MS
QRS DURATION: 76 MS
QTC CALCULATION (BEZET): 421 MS
R AXIS: -6 DEGREES
T AXIS: 43 DEGREES
VENTRICULAR RATE: 98 BPM

## 2024-10-08 ENCOUNTER — TELEPHONE (OUTPATIENT)
Dept: OBGYN CLINIC | Facility: CLINIC | Age: 34
End: 2024-10-08

## 2024-10-08 NOTE — TELEPHONE ENCOUNTER
TELEPHONE NOTE    Returned patient's call regarding possible yeast infection. Patient states she was recently treated for sinus infection w/ Augmentin and now has vaginal irritation similar to prior yeast infections. On chart review, patient has not been seen in >1yr. Advised patient to come in ASAP for annual exam and to have discharge swabbed. Patient inquired regarding OTC options. Advised monistat may treat some strains of yeast but it is preferable to identify the organism before treating. I have reached out to clinic staff to facilitate scheduling. Patient voiced understanding.     Shazia Dias, DO

## 2024-10-09 ENCOUNTER — TELEPHONE (OUTPATIENT)
Dept: OBGYN CLINIC | Facility: CLINIC | Age: 34
End: 2024-10-09

## 2024-10-09 DIAGNOSIS — N89.8 VAGINAL ITCHING: Primary | ICD-10-CM

## 2024-10-09 RX ORDER — FLUCONAZOLE 150 MG/1
TABLET ORAL
Qty: 2 TABLET | Refills: 0 | Status: SHIPPED | OUTPATIENT
Start: 2024-10-09

## 2024-10-09 NOTE — TELEPHONE ENCOUNTER
RN spoke with pt and sked her for annual with EB on 10/25. Pt just finished taking Augmentin and reports vaginal itching and thick, white discharge. RN could not find pt an immediate appt, so RN sent Diflucan to pt's preferred pharmacy (verified). RN provided admin instructions. Pt verbalized understanding and agreed with plan of care.

## 2024-10-14 ENCOUNTER — TELEPHONE (OUTPATIENT)
Dept: SURGERY | Facility: CLINIC | Age: 34
End: 2024-10-14

## 2024-10-14 NOTE — TELEPHONE ENCOUNTER
I called and left the patient a voice mail about her insurance being ineligible. I informed her that her appointment would be marked as self-pay until we can verify her insurance.

## 2024-10-25 ENCOUNTER — OFFICE VISIT (OUTPATIENT)
Dept: SURGERY | Facility: CLINIC | Age: 34
End: 2024-10-25
Payer: COMMERCIAL

## 2024-10-25 VITALS
HEIGHT: 66 IN | DIASTOLIC BLOOD PRESSURE: 74 MMHG | BODY MASS INDEX: 30.31 KG/M2 | SYSTOLIC BLOOD PRESSURE: 116 MMHG | WEIGHT: 188.63 LBS

## 2024-10-25 DIAGNOSIS — Z01.419 WOMEN'S ANNUAL ROUTINE GYNECOLOGICAL EXAMINATION: Primary | ICD-10-CM

## 2024-10-25 DIAGNOSIS — N89.8 VAGINAL IRRITATION: ICD-10-CM

## 2024-10-25 PROCEDURE — 3074F SYST BP LT 130 MM HG: CPT | Performed by: OBSTETRICS & GYNECOLOGY

## 2024-10-25 PROCEDURE — 81514 NFCT DS BV&VAGINITIS DNA ALG: CPT | Performed by: OBSTETRICS & GYNECOLOGY

## 2024-10-25 PROCEDURE — 99395 PREV VISIT EST AGE 18-39: CPT | Performed by: OBSTETRICS & GYNECOLOGY

## 2024-10-25 PROCEDURE — 3008F BODY MASS INDEX DOCD: CPT | Performed by: OBSTETRICS & GYNECOLOGY

## 2024-10-25 PROCEDURE — 99459 PELVIC EXAMINATION: CPT | Performed by: OBSTETRICS & GYNECOLOGY

## 2024-10-25 PROCEDURE — 3078F DIAST BP <80 MM HG: CPT | Performed by: OBSTETRICS & GYNECOLOGY

## 2024-10-25 NOTE — PROGRESS NOTES
GYN ANNUAL    10/25/2024  2:07 PM    Chief Complaint   Patient presents with    Annual     Annual exam     Vaginal Problem     Yeast infection after taking antibiotics    .    HPI: Patient is a 34 year old  LMP 10/12/24 presents for annual gyn exam. Cycles regular. Reports concerns about yeast infection after completing antibiotics - currently without symptoms. No other gynecologic issues or concerns.       OB History    Para Term  AB Living   4 3 2 1 1 3   SAB IAB Ectopic Multiple Live Births   0 0 0   3      # Outcome Date GA Lbr Star/2nd Weight Sex Type Anes PTL Lv   4  21 36w4d   M CS-LTranv Spinal  ISAIAH   3 Term 16 39w2d  7 lb 13 oz (3.544 kg) F NORMAL SPONT EPI  ISAIAH      Complications: Hemorrhage, Meconium   2 Term 13 40w1d  8 lb 4 oz (3.742 kg) F NORMAL SPONT EPI  ISAIAH      Complications: Hemorrhage   1 AB 2006              Birth Comments: EAB-pill         GYN hx:    Hx Prior Abnormal Pap: Yes  Pap Date: 10/09/23 ([facility])  Pap Result Notes: normal  CONTRACEPTION: BTL  LAST MAMMOGRAM: N/A      Current Outpatient Medications   Medication Sig Dispense Refill    albuterol (PROAIR HFA) 108 (90 Base) MCG/ACT Inhalation Aero Soln 2 puffs every 4-6 hours prn wheezing or shortness of breath 1 each 0    albuterol 108 (90 Base) MCG/ACT Inhalation Aero Soln Inhale 2 puffs into the lungs every 4 to 6 hours as needed for Wheezing or Shortness of Breath. 1 each 0    clindamycin 1 % External Lotion Apply 1 Application topically every morning. APPLY TO FACE      Rizatriptan Benzoate 5 MG Oral Tab Take 1 tablet (5 mg total) by mouth as needed for Migraine. 30 tablet 0       Past Medical History:    Abnormal Pap smear of cervix    Acne    Allergies    Anemia    Anemia during pregnancy in third trimester ([facility])    Hematology consult. Completed IV Venofer weekly x 5  from 3/5-3/15/21.    Asthma ([facility])    Decorative tattoo    last 2015    Dysmenorrhea    History of retained  placenta in prior pregnancy, currently pregnant ([facility])    History of use of contraceptive intrauterine device (IUD)    Paragard IUD 2017-2020    Human papilloma virus infection    Migraine with aura and without status migrainosus, not intractable    Multigravida in third trimester ([facility])    Placenta succenturiate lobe affecting fetus    Prediabetes    Pregnancy ([facility])    Pregnancy examination or test, positive result ([facility])    Pregnancy related condition in third trimester ([facility])    Thyroid disease    thyroid nodule     Past Surgical History:   Procedure Laterality Date      2021    Colposcopy, cervix w/upper adjacent vagina; w/biopsy(s), cervix      Insert intrauterine device  2017    Paragard IUD     Remove intrauterine device  2020    Tubal ligation  2021     Allergies[1]  Family History   Problem Relation Age of Onset    Hypertension Paternal Grandmother         Thyroid    Other (Other) Paternal Grandmother      Social History     Socioeconomic History    Marital status:    Occupational History    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 All pertinent positives and negatives noted in the HPI        /74   Ht 66\"   Wt 188 lb 9.6 oz (85.5 kg)   LMP 10/12/2024 (Approximate)   BMI 30.44 kg/m²     Exam:   GENERAL: well developed, well nourished, in no apparent distress  SKIN: no rashes, no lesions  HEENT: normal  LUNGS: respiration unlabored  CARDIOVASCULAR: no peripheral edema or varicosities, skin warm and dry  BREASTS:  bilaterally nontender, no palpable masses, no nipple discharge, no skin changes, no axillary adenopathy  ABDOMEN: Soft, non distended; non tender, no masses  GYNE/:   External Genitalia: normal, no lesions, good perineal support  Urethra: meatus normal  Bladder: well supported  Vagina: normal mucosa, no lesions, mucoid discharge cultured  Uterus: normal size, mobile, nontender  Cervix: normal os, no lesions or bleeding  Adnexa: normal size, bilaterally nontender, no palpable masses  Cul-de-sac: normal  R/V: normal perineum, no hemorrhoids  EXTREMITIES: nontender without edema      A/P: Patient is 34 year old female here for well-woman exam.     1. Women's annual routine gynecological examination  - Normal exam    2. Vaginal irritation  - Cultures obtained  - Instructed on nightly boric acid x 7 days while await results        10/25/2024  Dodie Hinson MD                    [1] No Known Allergies

## 2025-01-17 NOTE — TELEPHONE ENCOUNTER
Spoke to patient advised of referral. Patient wanted to know about labs for appointment. Advised that she should speak with endo regarding any blood work. Patient states she moved to [place] and will be looking for Provider closer to home in the near future. Patient provided went office locations closer to home. Patient also wanted to know if insurance card she scanned was in her chart. Advised that insurance card is available in media tab. Patient states she will call to make appointment with Endo.

## 2025-01-17 NOTE — TELEPHONE ENCOUNTER
Patient calling,verified name and date of birth.    She requests a referral to see Dr Hansen for follow up of thyroid nodules.  She feels that  they might have increased in size, she feels  something when she swallows.  She has a new insurance plan and uploaded her new card on [employer].  Dr Wilson, referral pended for your review.

## 2025-02-21 NOTE — PROGRESS NOTES
Name: Yodit Pettit  Date: 2/21/2025    Referring Physician: Denver Wilson    No chief complaint on file.      HISTORY OF PRESENT ILLNESS   Yodit Pettit is a 34 year old female who presents for No chief complaint on file.    33 y/o F presents for follow up evaluation of thyroid nodules.  She was diagnosed with nodules approximately 3 years ago.  She is concerned about nodules give family h/o thyroid cancer.     Thyroid US demonstrated subcm nodules 2024. She continues to have mild dysphagia at night.       She does have regular cycles but irregular bleeding pattern.  She has been evaluated by gynecology but persistent symptoms.     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  Head: normal  ENT: normal  Lungs: no shortness of breath, wheezing or STANTON  Cardiovascular:  no chest pain or palpitations  Gastrointestinal:  no abdominal pain, bowel movement problems  Musculoskeletal: no muscle pain or arthralgia  /Gyne: no frequency or discomfort while urinating  Psychiatric:  no acute distress, anxiety  or depression  Skin: normal moisturized skin    Medications:     Current Outpatient Medications:     albuterol (PROAIR HFA) 108 (90 Base) MCG/ACT Inhalation Aero Soln, 2 puffs every 4-6 hours prn wheezing or shortness of breath, Disp: 1 each, Rfl: 0    albuterol 108 (90 Base) MCG/ACT Inhalation Aero Soln, Inhale 2 puffs into the lungs every 4 to 6 hours as needed for Wheezing or Shortness of Breath., Disp: 1 each, Rfl: 0    clindamycin 1 % External Lotion, Apply 1 Application topically every morning. 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   Tobacco Use    Smoking status: Never    Smokeless tobacco: Never   Vaping Use    Vaping status: Never Used   Substance and Sexual Activity    Alcohol use: Never    Drug use: Never    Sexual activity: Yes     Partners: Male     Comment: active with     Other Topics Concern    Blood Transfusions No    Caffeine Concern No    Stress Concern No    Weight Concern Yes    Special Diet No    Exercise No    Seat Belt No       Medical History:   Past Medical History:    Abnormal Pap smear of cervix    Acne    Allergies    Anemia    Anemia during pregnancy in third trimester ([facility])    Hematology consult. Completed IV Venofer weekly x 5  from 3/5-3/15/21.    Asthma ([facility])    Decorative tattoo    last 2015    Dysmenorrhea    History of retained placenta in prior pregnancy, currently pregnant ([facility])    History of use of contraceptive intrauterine device (IUD)    Paragard IUD 2017-2020    Human papilloma virus infection    Migraine with aura and without status migrainosus, not intractable    Multigravida in third trimester ([facility])    Placenta succenturiate lobe affecting fetus    Prediabetes    Pregnancy ([facility])    Pregnancy examination or test, positive result ([facility])    Pregnancy related condition in third trimester ([facility])    Thyroid disease    thyroid nodule       Surgical history:   Past Surgical History:   Procedure Laterality Date      2021    Colposcopy, cervix w/upper adjacent vagina; w/biopsy(s), cervix      Insert intrauterine device  2017    Paragard IUD     Remove intrauterine device  2020    Tubal ligation  2021       PHYSICAL EXAMINATION:  /77   Pulse 79   Wt 186 lb (84.4 kg)   LMP 10/12/2024 (Approximate)   BMI 30.02 kg/m²     General Appearance:  Alert, in no  acute distress, well developed  Eyes: normal conjunctivae, sclera.  Ears/Nose/Mouth/Throat/Neck:  normal hearing, normal speech and diffusely enlarged thyroid gland  Musculoskeletal:  normal muscle strength and tone  PV: normal pulses of carotids, pedals  Skin:  normal moisture and skin texture  Hair & Nails:  normal scalp hair     Neuro:  sensory grossly intact and motor grossly intact  Psychiatric:  oriented to time, self, and place  Nutritional:  no abnormal weight gain or loss    ASSESSMENT/PLAN:    1. Thyroid Nodules  -Discussed common occurrence of thyroid nodules in the population approx 50-60% of the population  -Discussed risk of malignancy is approx 3-5%, 95-97% of nodules are benign  -Discussed recommendation to perform FNA biopsy of nodules greater than 1cm in size  -No need for FNA given small size  -Recheck US given persistent symptoms   -Recheck TSH, FT4  -Further management based on above results    2. Impaired Fasting Glucose  - Discussed new diagnosis   - Recheck HgA1c and CMP     RTC 1 year       2/21/2025  Jami Hansen MD

## 2025-02-22 NOTE — TELEPHONE ENCOUNTER
Action Requested: Summary for Provider     []  Critical Lab, Recommendations Needed  [x] Need Additional Advice  []   FYI    []   Need Orders  [] Need Medications Sent to Pharmacy  []  Other     SUMMARY: Per protocol disposition advised See within 2 Weeks in Office. Patient declines to schedule appointment at this time. She would like guidance on continuation of antibiotic. Reason for call: Acute (Blood in urine)  Onset: today around 1pm    Blood on tissue when wiping in the front and wiping in the back-- scant amount (no blood in toilet, no blood clots)  Last bowel movement was yesterday-- a little loose, but the color was normal.  Last menstrual cycle ended 10/14/23. Patient denies any possibility of pregnancy, reports she had a tubal ligation. She started azithromycin 10/14/23 and is unsure if her symptoms could be related to a medication side effect. She would like to know if it is safe to continue. Dr. Erika Venegas, please advise if symptoms could be related to antibiotic and if it is safe for patient to complete treatment. Thank you.     Reason for Disposition   Rectal bleeding is minimal (e.g., blood just on toilet paper, a few drops in toilet bowl)    Protocols used: Rectal Bleeding-A-OH
I have not heard blood in the urine or stool as a side effect of Azithromycin unless it causes diarrhea and a flare-up of hemorrhoids. Would recommend a visit if it persists.
Left message for patient to call back and discuss. Mychart message sent as well.
Pt read [facility] message with recommendations. Last read by Francisco Roper at  5:04 PM on 10/16/2023. No future appointments.
The patient is a 89y Male complaining of diarrhea.

## (undated) NOTE — LETTER
Date & Time: 9/5/2023, 11:31 PM  Patient: Day Javier  Encounter Provider(s):    Edis Valentin MD       To Whom It May Concern:    Efrian Rivas was seen and treated in our department on 9/5/2023. She should not return to work until 9/7/23 .     If you have any questions or concerns, please do not hesitate to call.        _____________________________  RN Signature

## (undated) NOTE — MR AVS SNAPSHOT
After Visit Summary   3/11/2021    Jess Ware    MRN: [id number]           Visit Information     Date & Time  3/11/2021  4:00 PM Provider  [facility] - Infusion Dept.  Phone  [phone]      Your Anemia during pregnancy in second trimester   [[id number]]             We Ordered the Following     Normal Orders This Visit    [address] [place] [address]       Future Appointments        Provider Department    3/15/2021 3:00 PM [facility] INFR[facility] 2 Nan cost  $35*     SAME DAY APPOINTMENTS   Available at primary care offices    AFTER HOURS CARE  [place]  OFFICE VISIT   Primary Care Providers  Treatment for mild illness or injury that does not require immediate attention.  Average cost  $70*   [facility]

## (undated) NOTE — Clinical Note
IUP at 35w5d  Placenta previa with anterior succenturiate lobe.   The excessive right lobe has signs suspicious for focal accreta  Normal fetal growth, anatomy and  testing  Normal cervical length    RECOMMENDATIONS:  Continue care with Dr. Oneil Ashley

## (undated) NOTE — LETTER
21    RE: Sudha Cruz    : 1990    To Whom It May Concern:    Our patient, Sudha Cruz,      __x___Has received treatment in our office on __21_________________.        _____Has been hospitalized on the following dates _________

## (undated) NOTE — LETTER
July 14, 2024    Yodit Pettit  [address] [address]      Dear Yodit:    The following are the results of your recent tests. Please review the list of test results.  Your result is the value on the left; we have also supplied the range of normal (low and high) values.    Results for orders placed or performed in visit on 07/11/24   TB /PPD intradermal test   Result Value Ref Range    Date Given: 7/11/2024     Site: Left Forearm     INDURATION (PPD) 0.0 0.0 - 11 mm     Your TB test is Negative with 0.0 mm induration.  Please call if you have further questions.             Emerita Beckham MA

## (undated) NOTE — LETTER
Date: 3/27/2023    Patient Name: Tuan Kelley          To Whom it may concern: This letter has been written at the patient's request. The above patient was seen at the [facility] for treatment of a medical condition. This patient should be excused from attending work from 03/27/2023 through 03/28/2023. The patient may return to work on 03/29/2023.       Sincerely,          PERI Gomes

## (undated) NOTE — LETTER
Date: 1/22/2023    Patient Name: Magnolia Mccartney          To Whom it may concern: This letter has been written at the patient's request. The above patient was seen at the [facility] for treatment of a medical condition. This patient should be excused from attending work from 1/22/23 through 1/23/23. The patient may return to work on 1/24/23.         Sincerely,        PERI Armijo

## (undated) NOTE — LETTER
Date & Time: 3/23/2023, 11:46 AM  Patient: Klarissa Perez  Encounter Provider(s):    PERI Marley       To Whom It May Concern:    Rubens Sharif was seen and treated in our department on 3/23/2023. She should not return to work until 03/27/2023.     If you have any questions or concerns, please do not hesitate to call.        _____________________________  Physician/APC Signature